# Patient Record
Sex: MALE | Employment: FULL TIME | URBAN - METROPOLITAN AREA
[De-identification: names, ages, dates, MRNs, and addresses within clinical notes are randomized per-mention and may not be internally consistent; named-entity substitution may affect disease eponyms.]

---

## 2017-01-25 ENCOUNTER — OFFICE VISIT - HEALTHEAST (OUTPATIENT)
Dept: FAMILY MEDICINE | Facility: CLINIC | Age: 42
End: 2017-01-25

## 2017-01-25 DIAGNOSIS — I10 ESSENTIAL HYPERTENSION, BENIGN: ICD-10-CM

## 2017-01-25 DIAGNOSIS — R05.9 COUGH: ICD-10-CM

## 2017-01-25 DIAGNOSIS — E78.49 OTHER HYPERLIPIDEMIA: ICD-10-CM

## 2017-01-25 ASSESSMENT — MIFFLIN-ST. JEOR: SCORE: 1616.83

## 2017-09-11 ENCOUNTER — OFFICE VISIT - HEALTHEAST (OUTPATIENT)
Dept: FAMILY MEDICINE | Facility: CLINIC | Age: 42
End: 2017-09-11

## 2017-09-11 DIAGNOSIS — J01.90 ACUTE SINUSITIS: ICD-10-CM

## 2017-11-29 ENCOUNTER — COMMUNICATION - HEALTHEAST (OUTPATIENT)
Dept: FAMILY MEDICINE | Facility: CLINIC | Age: 42
End: 2017-11-29

## 2017-12-19 ENCOUNTER — OFFICE VISIT - HEALTHEAST (OUTPATIENT)
Dept: FAMILY MEDICINE | Facility: CLINIC | Age: 42
End: 2017-12-19

## 2017-12-19 DIAGNOSIS — J30.9 ALLERGIC RHINITIS: ICD-10-CM

## 2017-12-27 ENCOUNTER — OFFICE VISIT - HEALTHEAST (OUTPATIENT)
Dept: FAMILY MEDICINE | Facility: CLINIC | Age: 42
End: 2017-12-27

## 2017-12-27 DIAGNOSIS — I10 ESSENTIAL HYPERTENSION, BENIGN: ICD-10-CM

## 2017-12-27 DIAGNOSIS — J30.9 ALLERGIC RHINITIS: ICD-10-CM

## 2017-12-27 DIAGNOSIS — J32.9 SINUSITIS: ICD-10-CM

## 2017-12-27 ASSESSMENT — MIFFLIN-ST. JEOR: SCORE: 1624.2

## 2018-02-27 ENCOUNTER — COMMUNICATION - HEALTHEAST (OUTPATIENT)
Dept: FAMILY MEDICINE | Facility: CLINIC | Age: 43
End: 2018-02-27

## 2018-03-28 ENCOUNTER — COMMUNICATION - HEALTHEAST (OUTPATIENT)
Dept: FAMILY MEDICINE | Facility: CLINIC | Age: 43
End: 2018-03-28

## 2018-03-28 ENCOUNTER — OFFICE VISIT - HEALTHEAST (OUTPATIENT)
Dept: FAMILY MEDICINE | Facility: CLINIC | Age: 43
End: 2018-03-28

## 2018-03-28 DIAGNOSIS — T48.5X5A RHINITIS MEDICAMENTOSA: ICD-10-CM

## 2018-03-28 DIAGNOSIS — J30.9 ALLERGIC RHINITIS: ICD-10-CM

## 2018-03-28 DIAGNOSIS — J31.0 RHINITIS MEDICAMENTOSA: ICD-10-CM

## 2018-03-28 DIAGNOSIS — R09.81 CHRONIC NASAL CONGESTION: ICD-10-CM

## 2018-03-28 DIAGNOSIS — I10 ESSENTIAL HYPERTENSION, BENIGN: ICD-10-CM

## 2018-03-28 ASSESSMENT — MIFFLIN-ST. JEOR: SCORE: 1632.71

## 2018-04-17 ENCOUNTER — OFFICE VISIT - HEALTHEAST (OUTPATIENT)
Dept: OTOLARYNGOLOGY | Facility: CLINIC | Age: 43
End: 2018-04-17

## 2018-04-17 DIAGNOSIS — T48.5X5A RHINITIS MEDICAMENTOSA: ICD-10-CM

## 2018-04-17 DIAGNOSIS — J31.0 RHINITIS MEDICAMENTOSA: ICD-10-CM

## 2018-04-17 DIAGNOSIS — J30.9 ALLERGIC RHINITIS: ICD-10-CM

## 2018-05-02 ENCOUNTER — OFFICE VISIT - HEALTHEAST (OUTPATIENT)
Dept: FAMILY MEDICINE | Facility: CLINIC | Age: 43
End: 2018-05-02

## 2018-05-02 DIAGNOSIS — L30.9 ECZEMA: ICD-10-CM

## 2018-05-02 DIAGNOSIS — J30.9 ALLERGIC RHINITIS: ICD-10-CM

## 2018-05-02 DIAGNOSIS — R09.81 CHRONIC NASAL CONGESTION: ICD-10-CM

## 2018-05-02 ASSESSMENT — MIFFLIN-ST. JEOR: SCORE: 1624.49

## 2019-01-26 ENCOUNTER — COMMUNICATION - HEALTHEAST (OUTPATIENT)
Dept: FAMILY MEDICINE | Facility: CLINIC | Age: 44
End: 2019-01-26

## 2019-04-24 ENCOUNTER — COMMUNICATION - HEALTHEAST (OUTPATIENT)
Dept: FAMILY MEDICINE | Facility: CLINIC | Age: 44
End: 2019-04-24

## 2019-04-24 DIAGNOSIS — E78.00 PURE HYPERCHOLESTEROLEMIA: ICD-10-CM

## 2019-08-19 ENCOUNTER — COMMUNICATION - HEALTHEAST (OUTPATIENT)
Dept: FAMILY MEDICINE | Facility: CLINIC | Age: 44
End: 2019-08-19

## 2019-08-19 DIAGNOSIS — I10 ESSENTIAL HYPERTENSION, BENIGN: ICD-10-CM

## 2019-10-01 ENCOUNTER — HEALTH MAINTENANCE LETTER (OUTPATIENT)
Age: 44
End: 2019-10-01

## 2019-10-07 ENCOUNTER — OFFICE VISIT - HEALTHEAST (OUTPATIENT)
Dept: FAMILY MEDICINE | Facility: CLINIC | Age: 44
End: 2019-10-07

## 2019-10-07 DIAGNOSIS — L72.3 SEBACEOUS CYST: ICD-10-CM

## 2019-10-07 ASSESSMENT — MIFFLIN-ST. JEOR: SCORE: 1624.25

## 2019-12-23 ENCOUNTER — COMMUNICATION - HEALTHEAST (OUTPATIENT)
Dept: FAMILY MEDICINE | Facility: CLINIC | Age: 44
End: 2019-12-23

## 2019-12-23 DIAGNOSIS — I10 ESSENTIAL HYPERTENSION, BENIGN: ICD-10-CM

## 2020-02-21 ENCOUNTER — COMMUNICATION - HEALTHEAST (OUTPATIENT)
Dept: FAMILY MEDICINE | Facility: CLINIC | Age: 45
End: 2020-02-21

## 2020-02-21 DIAGNOSIS — E78.00 PURE HYPERCHOLESTEROLEMIA: ICD-10-CM

## 2020-03-13 ENCOUNTER — COMMUNICATION - HEALTHEAST (OUTPATIENT)
Dept: FAMILY MEDICINE | Facility: CLINIC | Age: 45
End: 2020-03-13

## 2020-03-13 DIAGNOSIS — I10 ESSENTIAL HYPERTENSION, BENIGN: ICD-10-CM

## 2020-11-20 ENCOUNTER — COMMUNICATION - HEALTHEAST (OUTPATIENT)
Dept: FAMILY MEDICINE | Facility: CLINIC | Age: 45
End: 2020-11-20

## 2020-11-20 DIAGNOSIS — I10 ESSENTIAL HYPERTENSION, BENIGN: ICD-10-CM

## 2021-01-15 ENCOUNTER — HEALTH MAINTENANCE LETTER (OUTPATIENT)
Age: 46
End: 2021-01-15

## 2021-03-08 ENCOUNTER — VIRTUAL VISIT (OUTPATIENT)
Dept: FAMILY MEDICINE | Facility: CLINIC | Age: 46
End: 2021-03-08
Payer: COMMERCIAL

## 2021-03-08 DIAGNOSIS — E78.2 MIXED HYPERLIPIDEMIA: ICD-10-CM

## 2021-03-08 DIAGNOSIS — I10 ESSENTIAL HYPERTENSION: Primary | ICD-10-CM

## 2021-03-08 PROCEDURE — 99204 OFFICE O/P NEW MOD 45 MIN: CPT | Mod: GT | Performed by: PHYSICIAN ASSISTANT

## 2021-03-08 RX ORDER — LISINOPRIL AND HYDROCHLOROTHIAZIDE 20; 25 MG/1; MG/1
1 TABLET ORAL DAILY
Qty: 30 TABLET | Refills: 0 | Status: SHIPPED | OUTPATIENT
Start: 2021-03-08 | End: 2021-03-22

## 2021-03-08 RX ORDER — LISINOPRIL AND HYDROCHLOROTHIAZIDE 20; 25 MG/1; MG/1
TABLET ORAL
COMMUNITY
Start: 2020-11-20 | End: 2021-03-08

## 2021-03-08 RX ORDER — ATORVASTATIN CALCIUM 20 MG/1
TABLET, FILM COATED ORAL
COMMUNITY
Start: 2020-02-24 | End: 2021-03-08

## 2021-03-08 RX ORDER — ATORVASTATIN CALCIUM 20 MG/1
20 TABLET, FILM COATED ORAL DAILY
Qty: 30 TABLET | Refills: 0 | Status: SHIPPED | OUTPATIENT
Start: 2021-03-08 | End: 2021-03-22

## 2021-03-08 SDOH — HEALTH STABILITY: MENTAL HEALTH: HOW OFTEN DO YOU HAVE A DRINK CONTAINING ALCOHOL?: NOT ASKED

## 2021-03-08 SDOH — HEALTH STABILITY: MENTAL HEALTH: HOW OFTEN DO YOU HAVE 6 OR MORE DRINKS ON ONE OCCASION?: NOT ASKED

## 2021-03-08 SDOH — HEALTH STABILITY: MENTAL HEALTH: HOW MANY STANDARD DRINKS CONTAINING ALCOHOL DO YOU HAVE ON A TYPICAL DAY?: NOT ASKED

## 2021-03-08 ASSESSMENT — PAIN SCALES - GENERAL: PAINLEVEL: NO PAIN (0)

## 2021-03-08 NOTE — PROGRESS NOTES
Fany is a 45 year old who is being evaluated via a billable video visit.      How would you like to obtain your AVS? MyChart  If the video visit is dropped, the invitation should be resent by: Text to cell phone: [phone]  Will anyone else be joining your video visit? No    Video Start Time: 7:40 AM    Assessment & Plan   Problem List Items Addressed This Visit     None      Visit Diagnoses     Essential hypertension    -  Primary    Relevant Medications    lisinopril-hydrochlorothiazide (ZESTORETIC) 20-25 MG tablet    Other Relevant Orders    Basic metabolic panel  (Ca, Cl, CO2, Creat, Gluc, K, Na, BUN)    Mixed hyperlipidemia        Relevant Medications    atorvastatin (LIPITOR) 20 MG tablet    Other Relevant Orders    Lipid Profile (Chol, Trig, HDL, LDL calc)       patient has not been seen in over 1-2 years, no recent labs, BP is high.  Refills are granted for 1 month only.  Patient needs to be seen in person to recheck BP, do labs     musculoskeletal problem needs to be addressed in person for proper exam and possible neck xray.      12 minutes spent on the date of the encounter doing chart review, history and exam, documentation and further activities as noted above        Tobacco Cessation:   reports that he has been smoking. He has been smoking about 0.50 packs per day. He has never used smokeless tobacco.          Return in about 1 month (around 4/8/2021).    Sintia Abdalla PA-C  [facility]    Kira Soares is a 45 year old who presents for the following health issues    HPI       Hyperlipidemia Follow-Up      Are you regularly taking any medication or supplement to lower your cholesterol?   Yes- Atorvastatin    Are you having muscle aches or other side effects that you think could be caused by your cholesterol lowering medication?  No    Hypertension Follow-up      Do you check your blood pressure regularly outside of the clinic? Yes     Are you following a low  salt diet? No    Are your blood pressures ever more than 140 on the top number (systolic) OR more   than 90 on the bottom number (diastolic), for example 140/90? Yes      How many servings of fruits and vegetables do you eat daily?  0-1    On average, how many sweetened beverages do you drink each day (Examples: soda, juice, sweet tea, etc.  Do NOT count diet or artificially sweetened beverages)?   1    How many days per week do you exercise enough to make your heart beat faster? 3 or less    How many minutes a day do you exercise enough to make your heart beat faster? 9 or less    How many days per week do you miss taking your medication? 0    Concern - Hand  Onset: 1 month  Description: Patient complains of numbness without pain on right hand index and pinky finger.  Intensity: severe  Progression of Symptoms:  same  Accompanying Signs & Symptoms: numbness  Previous history of similar problem: none  Precipitating factors:        Worsened by: bending elbow or typing  Alleviating factors:        Improved by: none  Therapies tried and outcome: ice        Review of Systems   Constitutional, HEENT, cardiovascular, pulmonary, gi and gu systems are negative, except as otherwise noted.      Objective    Vitals - Patient Reported  Systolic (Patient Reported): (!) 150  Diastolic (Patient Reported): (!) 90  Pain Score: No Pain (0)        Physical Exam   GENERAL: Healthy, alert and no distress  EYES: Eyes grossly normal to inspection.  No discharge or erythema, or obvious scleral/conjunctival abnormalities.  RESP: No audible wheeze, cough, or visible cyanosis.  No visible retractions or increased work of breathing.    SKIN: Visible skin clear. No significant rash, abnormal pigmentation or lesions.  NEURO: Cranial nerves grossly intact.  Mentation and speech appropriate for age.  PSYCH: Mentation appears normal, affect normal/bright, judgement and insight intact, normal speech and appearance well-groomed.                 Video-Visit Details    Type of service:  Video Visit    Video End Time: 8:02 AM    Originating Location (pt. Location): Home    Distant Location (provider location):  [facility]     Platform used for Video Visit: [employer]

## 2021-03-22 ENCOUNTER — OFFICE VISIT (OUTPATIENT)
Dept: FAMILY MEDICINE | Facility: CLINIC | Age: 46
End: 2021-03-22
Payer: COMMERCIAL

## 2021-03-22 VITALS
OXYGEN SATURATION: 97 % | DIASTOLIC BLOOD PRESSURE: 89 MMHG | WEIGHT: 176.4 LBS | TEMPERATURE: 97.7 F | SYSTOLIC BLOOD PRESSURE: 125 MMHG | HEIGHT: 67 IN | HEART RATE: 74 BPM | BODY MASS INDEX: 27.69 KG/M2

## 2021-03-22 DIAGNOSIS — I10 ESSENTIAL HYPERTENSION: ICD-10-CM

## 2021-03-22 DIAGNOSIS — G56.21 ULNAR NERVE COMPRESSION, RIGHT: Primary | ICD-10-CM

## 2021-03-22 DIAGNOSIS — E78.2 MIXED HYPERLIPIDEMIA: ICD-10-CM

## 2021-03-22 LAB
ANION GAP SERPL CALCULATED.3IONS-SCNC: 1 MMOL/L (ref 3–14)
BUN SERPL-MCNC: 17 MG/DL (ref 7–30)
CALCIUM SERPL-MCNC: 9.3 MG/DL (ref 8.5–10.1)
CHLORIDE SERPL-SCNC: 106 MMOL/L (ref 94–109)
CHOLEST SERPL-MCNC: 213 MG/DL
CO2 SERPL-SCNC: 33 MMOL/L (ref 20–32)
CREAT SERPL-MCNC: 1.24 MG/DL (ref 0.66–1.25)
GFR SERPL CREATININE-BSD FRML MDRD: 69 ML/MIN/{1.73_M2}
GLUCOSE SERPL-MCNC: 114 MG/DL (ref 70–99)
HDLC SERPL-MCNC: 38 MG/DL
LDLC SERPL CALC-MCNC: ABNORMAL MG/DL
NONHDLC SERPL-MCNC: 175 MG/DL
POTASSIUM SERPL-SCNC: 3.9 MMOL/L (ref 3.4–5.3)
SODIUM SERPL-SCNC: 140 MMOL/L (ref 133–144)
TRIGL SERPL-MCNC: 482 MG/DL

## 2021-03-22 PROCEDURE — 36415 COLL VENOUS BLD VENIPUNCTURE: CPT | Performed by: PHYSICIAN ASSISTANT

## 2021-03-22 PROCEDURE — 80061 LIPID PANEL: CPT | Performed by: PHYSICIAN ASSISTANT

## 2021-03-22 PROCEDURE — 80048 BASIC METABOLIC PNL TOTAL CA: CPT | Performed by: PHYSICIAN ASSISTANT

## 2021-03-22 PROCEDURE — 99214 OFFICE O/P EST MOD 30 MIN: CPT | Performed by: PHYSICIAN ASSISTANT

## 2021-03-22 RX ORDER — LISINOPRIL AND HYDROCHLOROTHIAZIDE 20; 25 MG/1; MG/1
1 TABLET ORAL DAILY
Qty: 90 TABLET | Refills: 3 | Status: SHIPPED | OUTPATIENT
Start: 2021-03-22 | End: 2022-04-18

## 2021-03-22 RX ORDER — ATORVASTATIN CALCIUM 20 MG/1
20 TABLET, FILM COATED ORAL DAILY
Qty: 90 TABLET | Refills: 3 | Status: SHIPPED | OUTPATIENT
Start: 2021-03-22 | End: 2022-04-18

## 2021-03-22 ASSESSMENT — PAIN SCALES - GENERAL: PAINLEVEL: NO PAIN (0)

## 2021-03-22 ASSESSMENT — MIFFLIN-ST. JEOR: SCORE: 1638.78

## 2021-03-22 NOTE — PROGRESS NOTES
"    Assessment & Plan   Problem List Items Addressed This Visit     None      Visit Diagnoses     Ulnar nerve compression, right    -  Primary    Relevant Orders    Orthopedic & Spine  Referral    Mixed hyperlipidemia        Relevant Medications    atorvastatin (LIPITOR) 20 MG tablet    Essential hypertension        Relevant Medications    lisinopril-hydrochlorothiazide (ZESTORETIC) 20-25 MG tablet         1. Continue to wear tennis elbow band   Follow up with ortho for further work up of the ulnar nerve compression     2. stble  Take medication daily  Labs are pending  3. BP is stable   Continue current medications  Follow up in 6-12 months     25 minutes spent on the date of the encounter doing chart review, history and exam, documentation and further activities as noted above       Tobacco Cessation:   reports that he has been smoking. He has been smoking about 0.50 packs per day. He has never used smokeless tobacco.  Tobacco Cessation Action Plan: Phone counseling: Place order for Tobacco Cessation Referral    BMI:   Estimated body mass index is 27.63 kg/m  as calculated from the following:    Height as of this encounter: 1.702 m (5' 7\").    Weight as of this encounter: 80 kg (176 lb 6.4 oz).   Weight management plan: Discussed healthy diet and exercise guidelines        Return in about 1 month (around 4/22/2021) for ortho.    Sintia Abdalla PA-C  [facility] MYKE Soares is a 45 year old who presents for the following health issues     HPI     Musculoskeletal problem/pain  Onset/Duration:1 month when tingling is constant. For 4-6 months the tingling has been happening  intermittently  Description  Location: 4th and 5th right digits feel tingly and numb  Joint Swelling: no  Redness: no  Pain: no  Warmth: no  Intensity:  moderate  Progression of Symptoms:  Constant, improving   Accompanying signs and symptoms:   Fevers: no  Numbness/tingling/weakness: YES - " "numbness, tingling  History  Trauma to the area: no  Recent illness:  no  Previous similar problem: YES used to have intermittent tingling in the finger for 4-6 months. Also used to have elbow tendonitis that resolves with icing for 6-12 month. No pain at this time.   Previous evaluation:  no  Precipitating or alleviating factors:  Aggravating factors include: keeping elbow flexed while typing makes tingling worse. Stretching elbow, relieves the symptoms.   Therapies tried and outcome: icing, tennis elbow band help    Hyperlipidemia Follow-Up      Are you regularly taking any medication or supplement to lower your cholesterol?   Yes- lipitor    Are you having muscle aches or other side effects that you think could be caused by your cholesterol lowering medication?  No    Hypertension Follow-up      Do you check your blood pressure regularly outside of the clinic? No     Are you following a low salt diet? No    Are your blood pressures ever more than 140 on the top number (systolic) OR more   than 90 on the bottom number (diastolic), for example 140/90? No      Review of Systems   Constitutional, HEENT, cardiovascular, pulmonary, gi and gu systems are negative, except as otherwise noted.      Objective    /89 (BP Location: Right arm, Patient Position: Sitting, Cuff Size: Adult Large)   Pulse 74   Temp 97.7  F (36.5  C) (Oral)   Ht 1.702 m (5' 7\")   Wt 80 kg (176 lb 6.4 oz)   SpO2 97%   BMI 27.63 kg/m    Body mass index is 27.63 kg/m .  Physical Exam   GENERAL: healthy, alert and no distress  NECK: no adenopathy, no asymmetry, masses, or scars and thyroid normal to palpation  RESP: lungs clear to auscultation - no rales, rhonchi or wheezes  CV: regular rate and rhythm, normal S1 S2, no S3 or S4, no murmur, click or rub, no peripheral edema and peripheral pulses strong  ABDOMEN: soft, nontender, no hepatosplenomegaly, no masses and bowel sounds normal  MS: RUE exam shows normal strength and muscle mass, no " deformities, no erythema, induration, or nodules, no evidence of joint effusion, ROM of all joints is normal, no evidence of joint instability and  strength is intact in the right hand. Wrist NROM, no tenderness, right elbow, NROM, no tenderness. Tinnels test is negative. Sensation is decreased in right 4th and 5th digits

## 2021-03-22 NOTE — PATIENT INSTRUCTIONS
At [facility], we strive to deliver an exceptional experience to you, every time we see you. If you receive a survey, please complete it as we do value your feedback.  If you have MyChart, you can expect to receive results automatically within 24 hours of their completion.  Your provider will send a note interpreting your results as well.   If you do not have MyChart, you should receive your results in about a week by mail.    Your care team:                            Family Medicine Internal Medicine   MD Santiago Sanchez MD Shantel Branch-Fleming, MD Srinivasa Vaka, MD Katya Belousova, PACELESTINE Quintero, APRN CNP    Luca Benjamin, MD Pediatrics   Hosea Romero, PACELESTINE Charles, CNP MD Delfina Leblanc APRN CNP   MD Jayne Toledo MD Deborah Mielke, MD Emelia Hitchcock, APRN [facility]      Clinic hours: Monday - Thursday 7 am-6 pm; Fridays 7 am-5 pm.   Urgent care: Monday - Friday 11 am-9 pm; Saturday and Sunday 9 am-5 pm.    Clinic: [phone]       [facility]: Monday - Thursday 8 am - 7 pm; Friday 8 am - 6 pm  [facility]: [phone]     Use [email or web address] for 24/7 diagnosis and treatment of dozens of conditions.

## 2021-03-26 ENCOUNTER — OFFICE VISIT (OUTPATIENT)
Dept: ORTHOPEDICS | Facility: CLINIC | Age: 46
End: 2021-03-26
Payer: COMMERCIAL

## 2021-03-26 VITALS
SYSTOLIC BLOOD PRESSURE: 134 MMHG | HEIGHT: 67 IN | DIASTOLIC BLOOD PRESSURE: 88 MMHG | WEIGHT: 176 LBS | BODY MASS INDEX: 27.62 KG/M2

## 2021-03-26 DIAGNOSIS — G56.21 ULNAR NERVE COMPRESSION, RIGHT: ICD-10-CM

## 2021-03-26 DIAGNOSIS — G56.21 ULNAR NEUROPATHY OF RIGHT UPPER EXTREMITY: Primary | ICD-10-CM

## 2021-03-26 PROCEDURE — 99203 OFFICE O/P NEW LOW 30 MIN: CPT | Performed by: FAMILY MEDICINE

## 2021-03-26 RX ORDER — METHYLPREDNISOLONE 4 MG
TABLET, DOSE PACK ORAL
Qty: 21 TABLET | Refills: 0 | Status: SHIPPED | OUTPATIENT
Start: 2021-03-26 | End: 2022-08-26

## 2021-03-26 ASSESSMENT — MIFFLIN-ST. JEOR: SCORE: 1636.96

## 2021-03-26 NOTE — PATIENT INSTRUCTIONS
Cubital Tunnel Syndrome    Cubital Tunnel Syndrome is a condition that involves pressure or stretching of the ulnar nerve (also known as the  funny bone  nerve), which can cause numbness or tingling in the ring and small fingers, pain in the forearm, and/or weakness in the hand. The ulnar nerve (Figure 1) runs in a groove on the inner side of the elbow.    Figure 1  Ulnar nerve at elbow joint (inner side of elbow), which is involved in cubital tunnel syndrome       Causes  There are a few causes of this ulnar nerve problem. These include:    Pressure: The nerve has little padding over it. Direct pressure (like leaning the arm on an arm rest) can press the nerve, causing the arm and hand -- especially the ring and small fingers -- to  fall asleep.   Stretching: Keeping the elbow bent for a long time can stretch the nerve behind the elbow. This can happen during sleep or when using a smart phone.  Anatomy: Sometimes, the ulnar nerve does not stay in its place and snaps back and forth over a bony bump as the elbow is moved. Repeated snapping can irritate the nerve. Sometimes, the soft tissues over the nerve become thicker or there is an  extra  muscle over the nerve that can keep it from working correctly.    Signs and Symptoms  Cubital tunnel syndrome can cause pain, loss of sensation, tingling and/or weakness.  Pins and needles  usually are felt in the ring and small fingers. These symptoms are often felt when the elbow is bent for a long period of time, such as while holding a phone or while sleeping. Some people feel weak or clumsy.    Diagnosis  Your doctor can learn much by asking you about your symptoms and examining you. S/he might test you for other medical problems like diabetes or thyroid disease. Sometimes, nerve testing (EMG/NCS) may be needed to see how much the nerve and muscle are being affected. This test also checks for other problems such as a pinched nerve in the neck, which can cause similar  symptoms.    Treatment  The first treatment is to avoid actions that cause symptoms. Wrapping a pillow or towel loosely around the elbow or wearing a splint at night to keep the elbow from bending can help. Avoiding leaning on the  funny bone  can also help. A hand therapist can help you find ways to avoid pressure on the nerve.      Cubital Tunnel Syndrome Brace        Sometimes, surgery may be needed to relieve the pressure on the nerve. This can involve releasing the nerve, moving the nerve to the front of the elbow, and/or removing a part of the bone. Your surgeon will talk to you about options.    Therapy is sometimes needed after surgery, and the time it takes to recover can vary. Numbness and tingling may improve quickly or slowly. It may take many months for recovery after surgery. Cubital tunnel symptoms may not totally go away after surgery, especially if symptoms are severe.        EXERCISES - Nerve Gliding          Ulnar nerve gliding exercises. While keeping your head in a neutral position: 1) Begin with your arm out, palm side of the hand facing up. 2) Bend the elbow toward you, palm side facing you. 3) Rotate the palm of your hand outward and bend your wrist so that the fingers are pointing towards you. 4) Twist your wrist so that the palm of your hand is now facing upward. 5) While your wrist remains bent, stretch out your arm into a straight position, with your fingers bent towards the floor. Hold each position for 5 seconds, repeat series 3-5 times.

## 2021-03-26 NOTE — PROGRESS NOTES
CHIEF COMPLAINT:  Consult (4th and 5th finger)       HISTORY OF PRESENT ILLNESS  Mr. Sage is a pleasant 45 year old year old male who presents to clinic today with numbness in his right 4th and 5th fingers.  Fany explains that it started gradually about a month and a half ago, causing weakness in his hand.  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  He does have some aching in the medial epicondylar region.  Denies any significant neck pain or increasing neck stiffness.  He has had some prolonged intermittent neck stiffness however.    Onset: gradual  Location: right 4th & 5th finger  Quality:  numb  Duration: 1.5 months   Severity: 5/10 at worst  Timing: Worsens throughout the day  Modifying factors: Rest, keeping the arm extending  Associated signs & symptoms: weakness  Treatments to date: None    Additional history: as documented    Review of Systems:    Have you recently had a a fever, chills, weight loss? No    Do you have any vision problems? No    Do you have any chest pain or edema? No    Do you have any shortness of breath or wheezing?  No    Do you have stomach problems? No    Do you have any numbness or focal weakness? Yes above    Do you have diabetes? No    Do you have problems with bleeding or clotting? No    Do you have an rashes or other skin lesions? No    MEDICAL HISTORY  There is no problem list on file for this patient.      Current Outpatient Medications   Medication Sig Dispense Refill     atorvastatin (LIPITOR) 20 MG tablet Take 1 tablet (20 mg) by mouth daily 90 tablet 3     lisinopril-hydrochlorothiazide (ZESTORETIC) 20-25 MG tablet Take 1 tablet by mouth daily 90 tablet 3       No Known Allergies    Family History   Problem Relation Age of Onset     Heart Surgery Father        Additional medical/Social/Surgical histories reviewed in [facility] and updated as appropriate.       PHYSICAL EXAM  There were no  vitals taken for this visit.    General  - normal appearance, in no obvious distress  Musculoskeletal -cervical region with full range of motion, nontender palpation, negative Spurling's test.  Musculoskeletal - Right elbow and upper extremity  - inspection: normal joint alignment, no obvious deformity, no swelling  - palpation: Mildly tender to palpation at cubital tunnel.  - ROM: Full elbow range of motion, painless  - strength: 5/5  strength, 5/5 flexion, extension, pronation, supination, 5/5 hand intrinsics  - special tests:  (-) Tinel's at elbow  Neuro  - no sensory or motor deficit in fingers with attention paid to digits 4 and 5, grossly normal coordination, normal muscle tone  Skin  - no ecchymosis, erythema, warmth, or induration, no obvious rash  Psych  - interactive, appropriate, normal mood and affect    IMAGING : None     ASSESSMENT & PLAN  Mr. Sage is a 45 year old year old male who presents to clinic today with ulnar neuropathy of right dominant upper extremity.      Diagnosis: Ulnar neuropathy of right upper extremity    Suspected cubital tunnel syndrome as historical worsening of paresthesias with prolonged sitting working at computer typing.  Cervical examination unremarkable otherwise and no significant strength deficits.  At this time would like to implement a cubital tunnel program.  He will start nerve gliding exercises, I have also provided a picture of the brace he can order on [employer] or at a local [employer] store for preventing flexion while sleeping.  Additionally he will start a Medrol pack which she can continue for the next 6 days.    Follow-up in 6 weeks time, sooner if worsening.  May consider EMG versus formal hand therapy referral at our next visit depending on symptom change.    It was a pleasure seeing Fany today.    Minor Bruce DO, [facility]  Primary Care Sports Medicine

## 2021-03-26 NOTE — LETTER
3/26/2021         RE: Fany Sage  [address]  [place] [address]        Dear Colleague,    Thank you for referring your patient, Fany Sage, to the [facility] [place]. Please see a copy of my visit note below.    CHIEF COMPLAINT:  Consult (4th and 5th finger)       HISTORY OF PRESENT ILLNESS  Mr. Sage is a pleasant 45 year old year old male who presents to clinic today with numbness in his right 4th and 5th fingers.  Fany explains that it started gradually about a month and a half ago, causing weakness in his hand.  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  He does have some aching in the medial epicondylar region.  Denies any significant neck pain or increasing neck stiffness.  He has had some prolonged intermittent neck stiffness however.    Onset: gradual  Location: right 4th & 5th finger  Quality:  numb  Duration: 1.5 months   Severity: 5/10 at worst  Timing: Worsens throughout the day  Modifying factors: Rest, keeping the arm extending  Associated signs & symptoms: weakness  Treatments to date: None    Additional history: as documented    Review of Systems:    Have you recently had a a fever, chills, weight loss? No    Do you have any vision problems? No    Do you have any chest pain or edema? No    Do you have any shortness of breath or wheezing?  No    Do you have stomach problems? No    Do you have any numbness or focal weakness? Yes above    Do you have diabetes? No    Do you have problems with bleeding or clotting? No    Do you have an rashes or other skin lesions? No    MEDICAL HISTORY  There is no problem list on file for this patient.      Current Outpatient Medications   Medication Sig Dispense Refill     atorvastatin (LIPITOR) 20 MG tablet Take 1 tablet (20 mg) by mouth daily 90 tablet 3     lisinopril-hydrochlorothiazide (ZESTORETIC) 20-25 MG tablet Take 1  tablet by mouth daily 90 tablet 3       No Known Allergies    Family History   Problem Relation Age of Onset     Heart Surgery Father        Additional medical/Social/Surgical histories reviewed in [facility] and updated as appropriate.       PHYSICAL EXAM  There were no vitals taken for this visit.    General  - normal appearance, in no obvious distress  Musculoskeletal -cervical region with full range of motion, nontender palpation, negative Spurling's test.  Musculoskeletal - Right elbow and upper extremity  - inspection: normal joint alignment, no obvious deformity, no swelling  - palpation: Mildly tender to palpation at cubital tunnel.  - ROM: Full elbow range of motion, painless  - strength: 5/5  strength, 5/5 flexion, extension, pronation, supination, 5/5 hand intrinsics  - special tests:  (-) Tinel's at elbow  Neuro  - no sensory or motor deficit in fingers with attention paid to digits 4 and 5, grossly normal coordination, normal muscle tone  Skin  - no ecchymosis, erythema, warmth, or induration, no obvious rash  Psych  - interactive, appropriate, normal mood and affect    IMAGING : None     ASSESSMENT & PLAN  Mr. Sage is a 45 year old year old male who presents to clinic today with ulnar neuropathy of right dominant upper extremity.      Diagnosis: Ulnar neuropathy of right upper extremity    Suspected cubital tunnel syndrome as historical worsening of paresthesias with prolonged sitting working at computer typing.  Cervical examination unremarkable otherwise and no significant strength deficits.  At this time would like to implement a cubital tunnel program.  He will start nerve gliding exercises, I have also provided a picture of the brace he can order on [employer] or at a local [employer] store for preventing flexion while sleeping.  Additionally he will start a Medrol pack which she can continue for the next 6 days.    Follow-up in 6 weeks time, sooner if worsening.  May consider EMG versus formal hand therapy  referral at our next visit depending on symptom change.    It was a pleasure seeing Fany today.    Minor Bruce DO, [facility]  Primary Care Sports Medicine        Again, thank you for allowing me to participate in the care of your patient.        Sincerely,        Minor Bruce DO

## 2021-05-07 ENCOUNTER — OFFICE VISIT (OUTPATIENT)
Dept: ORTHOPEDICS | Facility: CLINIC | Age: 46
End: 2021-05-07
Payer: COMMERCIAL

## 2021-05-07 ENCOUNTER — TELEPHONE (OUTPATIENT)
Dept: ORTHOPEDICS | Facility: CLINIC | Age: 46
End: 2021-05-07

## 2021-05-07 VITALS — DIASTOLIC BLOOD PRESSURE: 80 MMHG | HEART RATE: 78 BPM | SYSTOLIC BLOOD PRESSURE: 116 MMHG

## 2021-05-07 DIAGNOSIS — G56.21 ULNAR NEUROPATHY OF RIGHT UPPER EXTREMITY: Primary | ICD-10-CM

## 2021-05-07 PROCEDURE — 99213 OFFICE O/P EST LOW 20 MIN: CPT | Performed by: FAMILY MEDICINE

## 2021-05-07 ASSESSMENT — PAIN SCALES - GENERAL: PAINLEVEL: NO PAIN (0)

## 2021-05-07 NOTE — LETTER
5/7/2021         RE: Fany Sage  [address]  [place] [address]        Dear Colleague,    Thank you for referring your patient, Fany Sage, to the [facility] [place]. Please see a copy of my visit note below.    ESTABLISHED PATIENT FOLLOW-UP:  RECHECK (follow up for left arm neuropathy, left hand numbness. Reporting improvement since last visit)       HISTORY OF PRESENT ILLNESS  Mr. Sage is a pleasant 45 year old year old male who presents to clinic today for follow-up of ulnar neuropathy of right hand    Date of injury:3 mos  Date last seen: 3/26/21  Following Therapeutic Plan: Yes  Pain: Improved  Function: Improved  Interval History: Completed Medrol pack.  Fany purchased brace and wears ant night to block flexion.  Performing nerve gliding exercises.  He does note episodes of pain once or twice a day.  He feels this may be when he is typing.  He typically will fully extend his arm to rest it and this does ameliorate his symptoms.    He would be interested in considering additional treatment given his discomfort at this time.    Additional medical/Social/Surgical histories reviewed in [facility] and updated as appropriate.    REVIEW OF SYSTEMS (5/7/2021)  CONSTITUTIONAL: Denies fever and weight loss  GASTROINTESTINAL: Denies abdominal pain, nausea, vomiting  MUSCULOSKELETAL: See HPI  SKIN: Denies any recent rash or lesion  NEUROLOGICAL: Denies numbness or focal weakness     PHYSICAL EXAM  /80   Pulse 78     General  - normal appearance, in no obvious distress  Musculoskeletal -cervical region with full range of motion, nontender palpation, negative Spurling's test.  Musculoskeletal - Right elbow and upper extremity  - inspection: normal joint alignment, no obvious deformity, no swelling  - palpation: Mildly tender to palpation at cubital tunnel.  - ROM: Full elbow range of motion, painless  - strength: 5/5  strength, 5/5 flexion, extension, pronation,  supination, 5/5 hand intrinsics  - special tests:  (-) Tinel's at elbow  Neuro  - no sensory or motor deficit in fingers with attention paid to digits 4 and 5, grossly normal coordination, normal muscle tone  Skin  - no ecchymosis, erythema, warmth, or induration, no obvious rash  Psych  - interactive, appropriate, normal mood and affect     ASSESSMENT & PLAN  Mr. Sage is a 45 year old year old male who presents to clinic today with RECHECK (follow up for left arm neuropathy, left hand numbness. Reporting improvement since last visit)    Diagnosis: Ulnar neuropathy of right upper extremity     Differential diagnosis includes cubital tunnel syndrome, ulnar tunnel syndrome versus less likely cervical radiculitis.    Further diagnostics versus treatment reviewed with patient.  At this time we decided to move forward with formal hand therapy.  They may discuss ulnar nerve gliding in more detail, digital therapeutic measures.  He has no improvement over the next 4 weeks, will will perform EMG.  Patient questions were answered and he is understanding of the plan.    It was a pleasure seeing Fany.    Minor Bruce DO, [facility]  [facility] Medicine                [facility] FOLLOW-UP VISIT 5/7/2021    Fany Sage's chief complaint for this visit includes:  Chief Complaint   Patient presents with     RECHECK     follow up for left arm neuropathy, left hand numbness. Reporting improvement since last visit     PCP: No Ref-Primary, Physician    Referring Provider:  Sintia Abdalla PA-C  [address] [place] [address]    /80   Pulse 78   No Pain (0)       Interval History:     Follow up reason: Follow up for left hand numbness     Date of injury: no injury     Date last seen: 3/26/21    Following Therapeutic Plan: Yes     Pain: Improving    Function: Improving    Interval History:      Medical History:    Any recent changes to your medical history? No    Any new medication  prescribed since last visit? No    Review of Systems:    Do you have fever, chills, weight loss? No    Do you have any vision problems? No    Do you have any chest pain or edema? No    Do you have any shortness of breath or wheezing?  No    Do you have stomach problems? No    Do you have any urinary track issues? No    Do you have any numbness or focal weakness? No    Do you have diabetes? No    Do you have problems with bleeding or clotting? No    Do you have an rashes or other skin lesions? No        Again, thank you for allowing me to participate in the care of your patient.        Sincerely,        Minor Bruce, DO

## 2021-05-07 NOTE — TELEPHONE ENCOUNTER
5/7 Provided phone number [phone] to schedule follow up  in about 1 month (around 6/7/2021).    Lindsay haley Procedure   Orthopedics, Podiatry, Sports Medicine, ENT/Eye Specialties  [facility]   [phone]

## 2021-05-07 NOTE — PROGRESS NOTES
ESTABLISHED PATIENT FOLLOW-UP:  RECHECK (follow up for left arm neuropathy, left hand numbness. Reporting improvement since last visit)       HISTORY OF PRESENT ILLNESS  Mr. Sage is a pleasant 45 year old year old male who presents to clinic today for follow-up of ulnar neuropathy of right hand    Date of injury:3 mos  Date last seen: 3/26/21  Following Therapeutic Plan: Yes  Pain: Improved  Function: Improved  Interval History: Completed Medrol pack.  Fany purchased brace and wears ant night to block flexion.  Performing nerve gliding exercises.  He does note episodes of pain once or twice a day.  He feels this may be when he is typing.  He typically will fully extend his arm to rest it and this does ameliorate his symptoms.    He would be interested in considering additional treatment given his discomfort at this time.    Additional medical/Social/Surgical histories reviewed in [facility] and updated as appropriate.    REVIEW OF SYSTEMS (5/7/2021)  CONSTITUTIONAL: Denies fever and weight loss  GASTROINTESTINAL: Denies abdominal pain, nausea, vomiting  MUSCULOSKELETAL: See HPI  SKIN: Denies any recent rash or lesion  NEUROLOGICAL: Denies numbness or focal weakness     PHYSICAL EXAM  /80   Pulse 78     General  - normal appearance, in no obvious distress  Musculoskeletal -cervical region with full range of motion, nontender palpation, negative Spurling's test.  Musculoskeletal - Right elbow and upper extremity  - inspection: normal joint alignment, no obvious deformity, no swelling  - palpation: Mildly tender to palpation at cubital tunnel.  - ROM: Full elbow range of motion, painless  - strength: 5/5  strength, 5/5 flexion, extension, pronation, supination, 5/5 hand intrinsics  - special tests:  (-) Tinel's at elbow  Neuro  - no sensory or motor deficit in fingers with attention paid to digits 4 and 5, grossly normal coordination, normal muscle tone  Skin  - no ecchymosis, erythema, warmth, or induration, no  obvious rash  Psych  - interactive, appropriate, normal mood and affect     ASSESSMENT & PLAN  Mr. Sage is a 45 year old year old male who presents to clinic today with RECHECK (follow up for left arm neuropathy, left hand numbness. Reporting improvement since last visit)    Diagnosis: Ulnar neuropathy of right upper extremity     Differential diagnosis includes cubital tunnel syndrome, ulnar tunnel syndrome versus less likely cervical radiculitis.    Further diagnostics versus treatment reviewed with patient.  At this time we decided to move forward with formal hand therapy.  They may discuss ulnar nerve gliding in more detail, digital therapeutic measures.  He has no improvement over the next 4 weeks, will will perform EMG.  Patient questions were answered and he is understanding of the plan.    It was a pleasure seeing Fany.    Minor Bruce DO, CAM  Primary Care Sports Medicine

## 2021-05-07 NOTE — PROGRESS NOTES
[facility] FOLLOW-UP VISIT 5/7/2021    Fany Sage's chief complaint for this visit includes:  Chief Complaint   Patient presents with     RECHECK     follow up for left arm neuropathy, left hand numbness. Reporting improvement since last visit     PCP: No Ref-Primary, Physician    Referring Provider:  Sintia Abdalla PA-C  [address]  [place] [address]    /80   Pulse 78   No Pain (0)       Interval History:     Follow up reason: Follow up for left hand numbness     Date of injury: no injury     Date last seen: 3/26/21    Following Therapeutic Plan: Yes     Pain: Improving    Function: Improving    Interval History:      Medical History:    Any recent changes to your medical history? No    Any new medication prescribed since last visit? No    Review of Systems:    Do you have fever, chills, weight loss? No    Do you have any vision problems? No    Do you have any chest pain or edema? No    Do you have any shortness of breath or wheezing?  No    Do you have stomach problems? No    Do you have any urinary track issues? No    Do you have any numbness or focal weakness? No    Do you have diabetes? No    Do you have problems with bleeding or clotting? No    Do you have an rashes or other skin lesions? No

## 2021-05-07 NOTE — PATIENT INSTRUCTIONS
Thanks for coming today.  Ortho/Sports Medicine Clinic  [address] [place] [address]    To schedule future appointments in Ortho Clinic, you may call [phone].    To schedule ordered imaging by your provider:   Call Central Imaging [phone]    To schedule an injection ordered by your provider:  Call Central Imaging Injection scheduling line: [phone]  [facility] available online at:  [email or web address]    Please call if any further questions or concerns ([phone]).  Clinic hours 8 am to 5 pm.    Return to clinic (call) if symptoms worsen or fail to improve.

## 2021-05-14 NOTE — TELEPHONE ENCOUNTER
5/14 2nd attempt.  Provided phone number [phone] to schedule follow up  in about 1 month (around 6/7/2021).    Lindsay haley Procedure   Orthopedics, Podiatry, Sports Medicine, ENT/Eye Specialties  [facility]   [phone]

## 2021-05-17 ENCOUNTER — THERAPY VISIT (OUTPATIENT)
Dept: OCCUPATIONAL THERAPY | Facility: CLINIC | Age: 46
End: 2021-05-17
Payer: COMMERCIAL

## 2021-05-17 DIAGNOSIS — G56.21 CUBITAL TUNNEL SYNDROME ON RIGHT: ICD-10-CM

## 2021-05-17 PROCEDURE — 97035 APP MDLTY 1+ULTRASOUND EA 15: CPT | Mod: GO | Performed by: OCCUPATIONAL THERAPIST

## 2021-05-17 PROCEDURE — 97112 NEUROMUSCULAR REEDUCATION: CPT | Mod: GO | Performed by: OCCUPATIONAL THERAPIST

## 2021-05-17 PROCEDURE — 97165 OT EVAL LOW COMPLEX 30 MIN: CPT | Mod: GO | Performed by: OCCUPATIONAL THERAPIST

## 2021-05-17 NOTE — PROGRESS NOTES
Hand Therapy Initial Evaluation    Current Date:  5/17/2021  Referring Physician: Dr. Bruce    Subjective:  Fany Sage is a 45 year old right hand dominant male.    Diagnosis:Right cubital tunnel  DOI:  11/2020    Patient reports symptoms of pain, stiffness/loss of motion, weakness/loss of strength, numbness and tingling  of the right hand  which occurred due to an unknown etiology. Since onset symptoms are gradually getting better.  Special tests:  none.  Previous treatment: none.    General health as reported by patient is good.  Pertinent medical history includes:High Blood Pressure, Overweight, Smoking  Medical allergies:none.  Surgical history: none.  Medication history: High Blood Pressure, cholesterol.    Occupational Profile Information:  Current occupation is   Currently working in normal job without restrictions  Job Tasks: Computer Work, Repetitive Tasks  Barriers include:none  Prior functional level:  no limitations  Mobility: No difficulty  Transportation: drives  Leisure activities/hobbies: fishing,     Objective:  Pain Level (Scale 0-10):   5/17/2021   At Rest 1/10   With Use 1/10     Pain Description:  Date 5/17/2021   Location ring finger and small finger   Pain Quality Numb and Tiring   Frequency intermittent     Pain is worst  daytime   Exacerbated by  typing, bending the elbow   Relieved by rest   Progression Gradually improving       ROM:  Elbow, wrist and hand are WNL  Edema:  None  Sensation:  Decreased Ulnar Nerve distribution. Patient reports numbness and tingling in the RF & SF    Numbness/Tingling Level Report  VAS(0-10) 5/17/2021   At Rest: 1/10   With Use: 3-4/10     Neural Tension Testing  UNT: Ulnar Neurodynamic Test (based on DS Gagan's ULNT)   5/17/2021   0-5 Scale 4-/5   Position:   0/5: Arm across abdomen in coronal plane  1/5: ER to neutral ABD shoulder to 45 degrees  2/5: ER shoulder to 90 degrees  3/5: Block shoulder and ABD shoulder to 110 degrees  4/5:  Fully pronate forearm, extend wrist and ring and small fingers  5/5: Flex elbow and bring hand to side of face  Notes:    (+) indicates beyond grade level but less than detention to next level    (-) indicates over detention to level    S1  onset/change of patient's symptoms    S2 definite stop point based on patient's discomfort level    Special Tests  Pain Report: - none  + mild    ++ moderate    +++ severe   Right 3/5/2020   Tinels at cubital tunnel -   Tinel's at [place] -   Elbow Flexion Test -   Ulnar nerve subluxation at elbow -   Froment's Test -     MMT Ulnar Nerve  Scale 0-5/5  Right 3/5/2020   FCU 5/5   FDP Ring and Small 5/5   ADM 4/5   ODM 5/5   FDM 5/5   Palmar Interossei 4/5   Dorsal Interossei 4/5   Lumbricals  5/5   Adductor Pollicis 5/5   FPB 5/5     Strength   (Measured in pounds)  Pain Report: - none  + mild    ++ moderate    +++ severe    5/17/2021 5/17/2021   Trials Left Right   1  2  3 88  80  75 85  79  77   Average 81 80     Lat Pinch 5/17/2021 5/17/2021   Trials Left Right   1  2  3 26 18   Average       3 Pt Pinch 5/17/2021 5/17/2021   Trials Left Right   1  2  3 19 14   Average       Palpation:  Pain Report:  Mild at cubital tunnel          Assessment:  Patient presents with symptoms consistent with diagnosis of cubital tunnel,  with conservative intervention.     Patient's limitations or Problem List includes:  Pain, Weakness, Sensory disturbance and Adherence in connective tissue of the right elbow, wrist, ring finger and small finger which interferes with the patient's ability to perform Work Tasks and Household Chores as compared to previous level of function.    Rehab Potential:  Excellent - Return to full activity, no limitations    Patient will benefit from skilled Occupational Therapy to increase flexibility, overall strength and sensation and decrease pain to return to previous activity level and resume normal daily tasks and to reach their rehab potential.    Barriers to  Learning:  No barrier    Communication Issues:  Patient appears to be able to clearly communicate and understand verbal and written communication and follow directions correctly.    Chart Review: Brief history including review of medical and/or therapy records relating to the presenting problem and Simple history review with patient    Identified Performance Deficits: home establishment and management, work and leisure activities    Assessment of Occupational Performance:  1-3 Performance Deficits  Clinical Decision Making (Complexity): Low complexity    Treatment Explanation:  The following has been discussed with the patient:  RX ordered/plan of care  Anticipated outcomes  Possible risks and side effects    Frequency:  2 X a month, once daily  Duration:  for 3 months  Treatment Plan:  Modalities:  US  Therapeutic Exercise:  PROM, Isotonics, Isometrics and Stabilization  Neuromuscular re-education:  Nerve Gliding and Kinesiotaping  Manual Techniques:  Myofascial release  Discharge Plan:  Achieve all LTG.  Independent in home treatment program.  Reach maximal therapeutic benefit.      Home Exercise Program:  Ulnar nerve glides  Avoid leaning on elbow and prolonged elbow flexion  MFR to flexors  Elbow brace at night    Next Visit:  No further visits scheduled at this time. The chart will be left open for one month to allow patient to schedule further appointments if problems develop. If no additional therapy sessions are scheduled, then the patient will be discharged and this will serve as a discharge note.

## 2021-05-28 ENCOUNTER — RECORDS - HEALTHEAST (OUTPATIENT)
Dept: ADMINISTRATIVE | Facility: CLINIC | Age: 46
End: 2021-05-28

## 2021-05-28 NOTE — TELEPHONE ENCOUNTER
Due to be seen    Rx renewed per Medication Renewal Policy. Medication was last renewed on 2/28/18.    Izabela Pope, [facility] Triage/Med Refill 4/26/2019     Requested Prescriptions   Pending Prescriptions Disp Refills     atorvastatin (LIPITOR) 20 MG tablet [Pharmacy Med Name: Atorvastatin Calcium Oral Tablet 20 MG] 90 tablet 1     Sig: TAKE ONE TABLET BY MOUTH ONE TIME DAILY.       Statins Refill Protocol (Hmg CoA Reductase Inhibitors) Passed - 4/24/2019  7:01 AM        Passed - PCP or prescribing provider visit in past 12 months      Last office visit with prescriber/PCP: 5/2/2018 Minor Mcintosh DO OR same dept: 5/2/2018 Minor Mcintosh DO OR same specialty: 5/2/2018 Minor Mcintosh DO  Last physical: Visit date not found Last MTM visit: Visit date not found   Next visit within 3 mo: Visit date not found  Next physical within 3 mo: Visit date not found  Prescriber OR PCP: Minor Peters DO  Last diagnosis associated with med order: There are no diagnoses linked to this encounter.  If protocol passes may refill for 12 months if within 3 months of last provider visit (or a total of 15 months).

## 2021-05-30 VITALS — HEIGHT: 67 IN | WEIGHT: 171.56 LBS | BODY MASS INDEX: 26.93 KG/M2

## 2021-05-31 VITALS — HEIGHT: 67 IN | BODY MASS INDEX: 27.18 KG/M2 | WEIGHT: 173.19 LBS

## 2021-05-31 NOTE — TELEPHONE ENCOUNTER
RN cannot approve Refill Request    RN can NOT refill this medication Protocol failed and NO refill given. Last office visit: 5/2/2018 Minor Mcintosh DO Last Physical: Visit date not found Last MTM visit: Visit date not found Last visit same specialty: 5/2/2018 Minor Mcintosh DO.  Next visit within 3 mo: Visit date not found  Next physical within 3 mo: Visit date not found      Flora Martin, [facility] Triage/Med Refill 8/19/2019    Requested Prescriptions   Pending Prescriptions Disp Refills     lisinopril-hydrochlorothiazide (PRINZIDE,ZESTORETIC) 20-25 mg per tablet [Pharmacy Med Name: Lisinopril-hydroCHLOROthiazide Oral Tablet 20-25 MG] 90 tablet 0     Sig: TAKE ONE TABLET BY MOUTH ONE TIME DAILY       Diuretics/Combination Diuretics Refill Protocol  Failed - 8/19/2019  1:31 PM        Failed - Visit with PCP or prescribing provider visit in past 12 months     Last office visit with prescriber/PCP: 5/2/2018 Minor Mcintosh DO OR same dept: Visit date not found OR same specialty: 5/2/2018 Minor Mcintosh DO  Last physical: Visit date not found Last MTM visit: Visit date not found   Next visit within 3 mo: Visit date not found  Next physical within 3 mo: Visit date not found  Prescriber OR PCP: Minor Peters DO  Last diagnosis associated with med order: There are no diagnoses linked to this encounter.  If protocol passes may refill for 12 months if within 3 months of last provider visit (or a total of 15 months).             Failed - Serum Potassium in past 12 months      No results found for: LN-POTASSIUM          Failed - Serum Sodium in past 12 months      No results found for: LN-SODIUM          Failed - Blood pressure on file in past 12 months     BP Readings from Last 1 Encounters:   05/02/18 112/70             Failed - Serum Creatinine in past 12 months      Creatinine   Date Value Ref Range Status   10/20/2016 1.17 0.70 -  1.30 mg/dL Final

## 2021-05-31 NOTE — TELEPHONE ENCOUNTER
30 day supply signed. Needs appointment before additional refills.    Please assist in scheduling appt

## 2021-05-31 NOTE — TELEPHONE ENCOUNTER
Left message to call back for: Fnay  Information to relay to patient:  Needs a follow up or even a Physical in next 30 days please help schedule upon return call, let pr know Dr Funez did refill Rx for 30 day supply but we are unable to fill after that

## 2021-05-31 NOTE — TELEPHONE ENCOUNTER
Left message to call back for: Fany Sage  Information to relay to patient:  Please notify patient and help schedule appointment.  Thank you.

## 2021-06-01 VITALS — WEIGHT: 175.06 LBS | HEIGHT: 67 IN | BODY MASS INDEX: 27.48 KG/M2

## 2021-06-01 VITALS — HEIGHT: 67 IN | BODY MASS INDEX: 27.19 KG/M2 | WEIGHT: 173.25 LBS

## 2021-06-02 NOTE — PROGRESS NOTES
"Assessment / Impression     1. Sebaceous cyst           Plan:     His symptoms appear to be due to a sebaceous cyst.  This is no longer inflamed since he drained it few weeks ago.  There is still residual bump.  I did not recommend any further treatment at this time as there are no signs of infection.  He may apply warm compresses as needed.  If symptoms become worse again he will contact the clinic to try and schedule an appointment then.    Subjective:      HPI: Fany Sage is a 44 y.o. male who presents to the clinic complaining of a bump on his left buttock that he noticed about a month ago.  It was larger and more inflamed.  He reports that it came to ahead and he was able to express out purulent fluid.  He feels like the bump has become much smaller, but he can still feel it.  Occasionally it feels a little sore.  He is otherwise feeling healthy.  He denies having fevers.  He reports having a similar boil in his armpit in the past.        Review of Systems  All other systems reviewed and are negative.     Social History     Tobacco Use   Smoking Status Current Every Day Smoker     Packs/day: 0.50     Years: 20.00     Pack years: 10.00   Smokeless Tobacco Never Used       Family History   Problem Relation Age of Onset     Stroke Mother      Hypertension Mother      Heart disease Father      Hyperlipidemia Father      Hypertension Father        Objective:     /82 (Patient Site: Left Arm, Patient Position: Sitting, Cuff Size: Adult Regular)   Pulse 72   Temp 98  F (36.7  C) (Oral)   Resp 16   Ht 5' 6.7\" (1.694 m)   Wt 174 lb 4 oz (79 kg)   BMI 27.54 kg/m    Physical Examination: General appearance - alert, well appearing, and in no distress  Neurological: alert, oriented, normal speech, no focal findings or movement disorder noted.    Extremities: No edema, no clubbing or cyanosis  Skin: There is a small, nontender bump with a tiny overlying scab in the left buttock.  The surrounding skin appears " normal.  There is no purulent discharge present.    No results found for this or any previous visit (from the past 168 hour(s)).    Current Outpatient Medications   Medication Sig     atorvastatin (LIPITOR) 20 MG tablet TAKE ONE TABLET BY MOUTH ONE TIME DAILY.     lisinopril-hydrochlorothiazide (PRINZIDE,ZESTORETIC) 20-25 mg per tablet Take 1 tablet by mouth daily. Needs appointment before additional refills.

## 2021-06-03 VITALS
HEIGHT: 67 IN | TEMPERATURE: 98 F | HEART RATE: 72 BPM | SYSTOLIC BLOOD PRESSURE: 120 MMHG | BODY MASS INDEX: 27.35 KG/M2 | DIASTOLIC BLOOD PRESSURE: 82 MMHG | RESPIRATION RATE: 16 BRPM | WEIGHT: 174.25 LBS

## 2021-06-04 NOTE — TELEPHONE ENCOUNTER
RN cannot approve Refill Request    RN can NOT refill this medication PCP messaged that patient is overdue for Labs. Last office visit: Visit date not found Last Physical: Visit date not found Last MTM visit: Visit date not found Last visit same specialty: 10/7/2019 Minor Mcintosh DO.  Next visit within 3 mo: Visit date not found  Next physical within 3 mo: Visit date not found      Mumtaz Nick, [facility] Connection Triage/Med Refill 12/25/2019    Requested Prescriptions   Pending Prescriptions Disp Refills     lisinopril-hydrochlorothiazide (PRINZIDE,ZESTORETIC) 20-25 mg per tablet [Pharmacy Med Name: Lisinopril-hydroCHLOROthiazide Oral Tablet 20-25 MG] 30 tablet 0     Sig: Take 1 tablet by mouth daily       Diuretics/Combination Diuretics Refill Protocol  Failed - 12/23/2019  7:01 AM        Failed - Serum Potassium in past 12 months      No results found for: LN-POTASSIUM          Failed - Serum Sodium in past 12 months      No results found for: LN-SODIUM          Failed - Serum Creatinine in past 12 months      Creatinine   Date Value Ref Range Status   10/20/2016 1.17 0.70 - 1.30 mg/dL Final             Passed - Visit with PCP or prescribing provider visit in past 12 months     Last office visit with prescriber/PCP: Visit date not found OR same dept: 10/7/2019 Minor Mcintosh DO OR same specialty: 10/7/2019 Minor Mcintosh DO  Last physical: Visit date not found Last MTM visit: Visit date not found   Next visit within 3 mo: Visit date not found  Next physical within 3 mo: Visit date not found  Prescriber OR PCP: Lela Funez MD  Last diagnosis associated with med order: 1. Benign Essential Hypertension  - lisinopril-hydrochlorothiazide (PRINZIDE,ZESTORETIC) 20-25 mg per tablet [Pharmacy Med Name: Lisinopril-hydroCHLOROthiazide Oral Tablet 20-25 MG]; Take 1 tablet by mouth daily  Dispense: 30 tablet; Refill: 0    If protocol passes may refill for 12 months if within  3 months of last provider visit (or a total of 15 months).             Passed - Blood pressure on file in past 12 months     BP Readings from Last 1 Encounters:   10/07/19 120/82

## 2021-06-06 NOTE — TELEPHONE ENCOUNTER
RN cannot approve Refill Request    RN can NOT refill this medication Protocol failed and NO refill given.       Izabela Pope, [facility] Triage/Med Refill 3/15/2020    Requested Prescriptions   Pending Prescriptions Disp Refills     lisinopriL-hydrochlorothiazide (PRINZIDE,ZESTORETIC) 20-25 mg per tablet [Pharmacy Med Name: Lisinopril-hydroCHLOROthiazide Oral Tablet 20-25 MG] 30 tablet 0     Sig: Take 1 tablet by mouth daily       Diuretics/Combination Diuretics Refill Protocol  Failed - 3/13/2020  7:01 AM        Failed - Serum Potassium in past 12 months      No results found for: LN-POTASSIUM          Failed - Serum Sodium in past 12 months      No results found for: LN-SODIUM          Failed - Serum Creatinine in past 12 months      Creatinine   Date Value Ref Range Status   10/20/2016 1.17 0.70 - 1.30 mg/dL Final             Passed - Visit with PCP or prescribing provider visit in past 12 months     Last office visit with prescriber/PCP: 10/7/2019 Minor Mcintosh DO OR same dept: 10/7/2019 Minor Mcintosh DO OR same specialty: 10/7/2019 Minor Mcintosh DO  Last physical: Visit date not found Last MTM visit: Visit date not found   Next visit within 3 mo: Visit date not found  Next physical within 3 mo: Visit date not found  Prescriber OR PCP: Minor Peters DO  Last diagnosis associated with med order: 1. Benign Essential Hypertension  - lisinopriL-hydrochlorothiazide (PRINZIDE,ZESTORETIC) 20-25 mg per tablet [Pharmacy Med Name: Lisinopril-hydroCHLOROthiazide Oral Tablet 20-25 MG]; Take 1 tablet by mouth daily  Dispense: 30 tablet; Refill: 0    If protocol passes may refill for 12 months if within 3 months of last provider visit (or a total of 15 months).             Passed - Blood pressure on file in past 12 months     BP Readings from Last 1 Encounters:   10/07/19 120/82

## 2021-06-06 NOTE — TELEPHONE ENCOUNTER
Refill Approved    Rx renewed per Medication Renewal Policy. Medication was last renewed on 4/26/19.    Sharla Fernandez, [facility] Triage/Med Refill 2/24/2020     Requested Prescriptions   Pending Prescriptions Disp Refills     atorvastatin (LIPITOR) 20 MG tablet [Pharmacy Med Name: Atorvastatin Calcium Oral Tablet 20 MG] 90 tablet 0     Sig: TAKE ONE TABLET BY MOUTH ONE TIME DAILY       Statins Refill Protocol (Hmg CoA Reductase Inhibitors) Passed - 2/21/2020  7:01 AM        Passed - PCP or prescribing provider visit in past 12 months      Last office visit with prescriber/PCP: 10/7/2019 Minor Mcintosh DO OR same dept: 10/7/2019 Minor Mcintosh DO OR same specialty: 10/7/2019 Minor Mcintosh DO  Last physical: Visit date not found Last MTM visit: Visit date not found   Next visit within 3 mo: Visit date not found  Next physical within 3 mo: Visit date not found  Prescriber OR PCP: Minor Peters DO  Last diagnosis associated with med order: 1. Pure hypercholesterolemia  - atorvastatin (LIPITOR) 20 MG tablet [Pharmacy Med Name: Atorvastatin Calcium Oral Tablet 20 MG]; TAKE ONE TABLET BY MOUTH ONE TIME DAILY.  Dispense: 90 tablet; Refill: 0    If protocol passes may refill for 12 months if within 3 months of last provider visit (or a total of 15 months).

## 2021-06-08 NOTE — PROGRESS NOTES
"Assessment / Impression     1. Cough  Bordetella pertussis PCR   2. Other hyperlipidemia     3. Benign Essential Hypertension         Plan:     He was given a prescription for Z-Hill.  I encouraged aggressive hydration.  We are screening for pertussis.  He was given new prescription for atorvastatin and he will continue the lisinopril-hydrochlorothiazide which has been working well to control his blood pressure.    Subjective:      HPI: Fany Sage is a 41 y.o. male who presents to the clinic to be evaluated for cough and cold symptoms that he has had for 5 weeks.  He describes feeling some mild congestion in his chest and throat.  He denies having fevers.  His 5-year-old daughter has been ill with similar symptoms.  She still has a lingering cough as well.  He thinks he may have noticed some wheezing symptoms.  He has not acutely short of breath.    He has a history of hyperlipidemia and hypertension.  Last October his total cholesterol is 221, triglycerides 497, HDL 30, LDL 95.  He was started on atorvastatin, but never started the prescription.  He would like to start it now.  He is currently on lisinopril-hydrochlorothiazide for hypertension.      Review of Systems  Pertinent items are noted in HPI.       Objective:     Visit Vitals     /80 (Patient Site: Right Arm, Patient Position: Sitting, Cuff Size: Adult Regular)     Pulse 80     Temp 98.1  F (36.7  C) (Oral)     Resp 16     Ht 5' 7\" (1.702 m)     Wt 171 lb 9 oz (77.8 kg)     SpO2 96%  Comment: RA     BMI 26.87 kg/m2       General Appearance: Alert, cooperative, appears slightly fatigued.  Head: Normocephalic, without obvious abnormality, atraumatic.  Eyes: PERRL, conjunctiva/corneas clear, EOM's intact.  Ears: Normal TM's and external ear canals, both ears.  Throat: Slightly erythematous. No exudates.  Neck: Supple, symmetrical, trachea midline, no lymphadenopathy.  Lungs: Clear to auscultation bilaterally, respirations unlabored.  He coughs " frequently throughout the exam.  Heart:: Regular rate and rhythm.  Extremities: Extremities normal, atraumatic, no cyanosis or edema.        No results found for this or any previous visit (from the past 168 hour(s)).

## 2021-06-12 NOTE — PROGRESS NOTES
Assessment:   There were no encounter diagnoses.     Plan:   No medications were ordered this encounter    There are no Patient Instructions on file for this visit.  No Follow-up on file.    Subjective:   Fany Sage is a 42 y.o. male who submitted an eVisit request for evaluation of his No chief complaint on file..  See the questionnaire and message section of encounter report for information related to history of present illness and review of systems.    The following portions of the patient's history were reviewed and updated as appropriate:  He  does not have any pertinent problems on file.  He has No Known Allergies..     Objective:   No exam performed today, patient submitted as eVisit.

## 2021-06-13 NOTE — TELEPHONE ENCOUNTER
RN cannot approve Refill Request    RN can NOT refill this medication Protocol failed and NO refill given. Last office visit: 10/7/2019 Minor Mcintosh DO Last Physical: Visit date not found Last MTM visit: Visit date not found Last visit same specialty: 10/7/2019 Minor Mcintosh DO.  Next visit within 3 mo: Visit date not found  Next physical within 3 mo: Visit date not found      Izabela Pope, [facility] [place] Connection Triage/Med Refill 11/20/2020    Requested Prescriptions   Pending Prescriptions Disp Refills     lisinopriL-hydrochlorothiazide (PRINZIDE,ZESTORETIC) 20-25 mg per tablet [Pharmacy Med Name: Lisinopril-hydroCHLOROthiazide Oral Tablet 20-25 MG] 90 tablet 0     Sig: Take 1 tablet by mouth daily. due for appt       Diuretics/Combination Diuretics Refill Protocol  Failed - 11/20/2020  2:00 AM        Failed - Visit with PCP or prescribing provider visit in past 12 months     Last office visit with prescriber/PCP: 10/7/2019 Minor Mcintosh DO OR same dept: Visit date not found OR same specialty: 10/7/2019 Minor Mcintosh DO  Last physical: Visit date not found Last MTM visit: Visit date not found   Next visit within 3 mo: Visit date not found  Next physical within 3 mo: Visit date not found  Prescriber OR PCP: Minor Peters DO  Last diagnosis associated with med order: 1. Benign Essential Hypertension  - lisinopriL-hydrochlorothiazide (PRINZIDE,ZESTORETIC) 20-25 mg per tablet [Pharmacy Med Name: Lisinopril-hydroCHLOROthiazide Oral Tablet 20-25 MG]; Take 1 tablet by mouth daily. due for appt  Dispense: 90 tablet; Refill: 0    If protocol passes may refill for 12 months if within 3 months of last provider visit (or a total of 15 months).             Failed - Serum Potassium in past 12 months      No results found for: LN-POTASSIUM          Failed - Serum Sodium in past 12 months      No results found for: LN-SODIUM          Failed - Blood pressure  on file in past 12 months     BP Readings from Last 1 Encounters:   10/07/19 120/82             Failed - Serum Creatinine in past 12 months      Creatinine   Date Value Ref Range Status   10/20/2016 1.17 0.70 - 1.30 mg/dL Final

## 2021-06-13 NOTE — TELEPHONE ENCOUNTER
1st attempt to contact patient    Left message to call back for: Fany      Information to relay to patient:  LMTCB    Please advise patient he is overdue for a visit in the office. He will need an office visit before any further refills will be sent for him.    Provider did send in a 30 day supply to get him through until able to be seen.    Please assist patient in scheduling an office visit.    Karime Carrington, CMA

## 2021-06-14 NOTE — PROGRESS NOTES
Assessment:   The encounter diagnosis was Allergic rhinitis.     Plan:   No medications were ordered this encounter    Patient Instructions     Ms. Soares, your symptoms sound very uncomfortable. Unfortunately they are a bit more complex than we can evaluate over the computer. I suggest either a visit to your normal doctor (Minor Peters) or a visit to an urgent care.    Bobo Dickens PA-C    Based on the information provided, I would recommend you come in for an appointment to discuss these symptoms. Please schedule an appointment.    You will not be charged for this eVisit.    Return for further follow up if needed. Call [phone]) or schedule an appointment via [facility]..    Subjective:   Fany Sage is a 42 y.o. male who submitted an eVisit request for evaluation of his Sinus Problem.  See the questionnaire and message section of encounter report for information related to history of present illness and review of systems.    The following portions of the patient's history were reviewed and updated as appropriate:  He  does not have any pertinent problems on file.  He has No Known Allergies..     Objective:   No exam performed today, patient submitted as eVisit.

## 2021-06-15 NOTE — PROGRESS NOTES
"Assessment / Impression     1. Sinusitis     2. Allergic rhinitis     3. Benign Essential Hypertension           Plan:     He was given a prescription for Augmentin.  I encouraged rest and hydration.  He may take Tylenol or ibuprofen as needed for discomfort.  He may also consider restarting the antihistamine to see if this helps improve symptoms.  He is not interested in trying Flonase since this has not been helpful in the past.  His blood pressures currently well controlled with lisinopril-hydrochlorothiazide.    Subjective:      HPI: Fany Sage is a 42 y.o. male who presents to the clinic to be evaluated for ongoing sinus congestion and pressure that he has had over the past month.  He describes having quite a bit of drainage.  He denies chest congestion, shortness of breath or wheezing symptoms.  He denies having a fever.  He has a history of seasonal allergies, but he is currently off his antihistamines.  He also has a history of hypertension and takes lisinopril and hydrochlorothiazide which he tolerates well.      Review of Systems  Pertinent items are noted in HPI.       Objective:     /78 (Patient Site: Right Arm, Patient Position: Sitting, Cuff Size: Adult Regular)  Pulse 80  Temp 97.6  F (36.4  C) (Oral)   Resp 16  Ht 5' 7\" (1.702 m)  Wt 173 lb 3 oz (78.6 kg)  BMI 27.12 kg/m2    General Appearance: Alert, cooperative, appears slightly fatigued.  Head: Normocephalic, without obvious abnormality, atraumatic.  Frontal sinuses nontender, maxillary sinuses tender to palpation bilaterally  Eyes: PERRL, conjunctiva/corneas clear, EOM's intact.  Ears: Normal TM's and external ear canals, both ears.  Nose: Nares congested.  Throat: Slightly erythematous. No exudates.  Neck: Supple, symmetrical, trachea midline, no lymphadenopathy.  Lungs: Clear to auscultation bilaterally, respirations unlabored.  Heart:: Regular rate and rhythm.  Extremities: Extremities normal, atraumatic, no cyanosis or " edema.        No results found for this or any previous visit (from the past 168 hour(s)).

## 2021-06-17 NOTE — PROGRESS NOTES
Assessment / Impression     1. Chronic nasal congestion  Ambulatory referral to ENT   2. Allergic rhinitis  CANCELED: Ambulatory referral to Allergy   3. Rhinitis medicamentosa     4. Benign Essential Hypertension           Plan:     He was given a referral to ENT to have his chronic nasal congestion symptoms evaluated further.  I recommended he stop using Afrin right away.  He likely has some component of rhinitis medicamentosa which is contributing to his symptoms.  That being said, he reports having issues with chronic nasal congestion before he ever started that medication.  He was given a prescription for azelastine nasal spray to see if this helps. He has a history of seasonal allergies and has never had formal allergy testing done before.  He does not feel like his current symptoms are related to that.  I put in a referral for him to be evaluated at the allergy clinic, but he prefers to see ENT first before pursuing that.  I think this is reasonable.  His blood pressures currently well controlled on lisinopril and hydrochlorothiazide.    Subjective:      HPI: Fany Sage is a 42 y.o. male who presents to the clinic to be evaluated for chronic nasal congestion symptoms.  This is been a problem for him over the past year.  He has a history of seasonal allergies and takes over-the-counter allergy pills in the spring/summer/fall.  He has tried taking these medications during the winter months to, but they have not improved his nasal congestion symptoms.  He is also tried various nasal sprays such as Flonase which were not helpful.  Over the past couple of months he has been using over-the-counter Afrin regularly.  This is provided some short-term benefit, but his symptoms have not resolved.  He has a history of hypertension which has been well controlled on lisinopril-hydrochlorothiazide 20-25 mg daily.      Review of Systems  Pertinent items are noted in HPI.       Objective:     /80 (Patient Site: Left  "Arm, Patient Position: Sitting, Cuff Size: Adult Regular)  Pulse 80  Temp 98  F (36.7  C) (Oral)   Resp 20  Ht 5' 7\" (1.702 m)  Wt 175 lb 1 oz (79.4 kg)  BMI 27.42 kg/m2    General Appearance: Alert, cooperative, in no acute distress  Head: Normocephalic, without obvious abnormality, atraumatic.  Sinuses nontender to palpation  Eyes: PERRL, conjunctiva/corneas clear, EOM's intact.  Ears: Normal TM's and external ear canals, both ears.  Nose: Nares congested.  Throat: Clear  Neck: Supple, symmetrical, trachea midline, no lymphadenopathy.  Lungs: Clear to auscultation bilaterally, respirations unlabored.  Heart:: Regular rate and rhythm.        No results found for this or any previous visit (from the past 168 hour(s)).      "

## 2021-06-17 NOTE — PROGRESS NOTES
HPI: This patient is a 43yo M who presents for evaluation of the sinuses at the request of Dr. Richards. The patient reports congestion issues each year during the winter months. This year has been particularly bad. He states he has tried several different OTC sprays for days at a time, though it is difficult to know which sprays these were as he does not know the names. He has been treated with antibiotics twice in the past for nasal congestion and clear rhinorrhea; not surprisingly, these were not helpful. There have not really been episodes of high fever, localized sinus pain, tooth pain, and pururlent drainage. Has a prescription for astelin, but he isn't really using anything regularly enough to be helpful. Denies dental grinding/clenching. 1/2ppd smoker x 20yrs. After further questioning following lack of response to topical neosynephrine here in clinic, he has been using OTC spray decongestants pretty regularly over the winter and only stopped 2 weeks ago because they stopped being helpful.    Past medical history, surgical history, social history, family history, medications, and allergies have been reviewed with the patient and are documented above.    Review of Systems: a 10-system review was performed. Pertinent positives are noted in the HPI and on a separate scanned document in the chart.    PHYSICAL EXAMINATION:  GEN: no acute distress, normocephalic  EYES: extraocular movements are intact, pupils are equal and round. Sclera clear.   EARS: auricles are normally formed. The external auditory canals are clear with minimal to no cerumen. Tympanic membranes are intact bilaterally with no signs of infection, effusion, retractions, or perforations.  NOSE: anterior nares are patent. There are no masses or lesions. The septum is non-obstructing. Rather significant turbinate enlargement with some hyperemia concerning for rhinitis medicamentosa--did not shrink or respond to spray  NASAL ENDOSCOPY: very  hyponasal voice spurred a need to look for polyps. No polyps in the nasal cavities, no adenoid hypertrophy, NP clear. Prominent anterior nasal floor bilaterally.  OC/OP: clear, dentition is in good repair. The tongue and palate are fully mobile and symmetric. The floor of mouth, base of tongue, and tonsils are soft and symmetric.  NECK: soft and supple. No lymphadenopathy or masses. Airway is midline.  NEURO: CN VII symmetric and strong. alert and oriented. No spontaneous nystagmus. Gait is normal.  PULM: breathing comfortably on room air, normal chest expansion with respiration  HEART: no cyanosis or clubbing    MEDICAL DECISION-MAKING: This patient is a 43yo M with rhinitis medicamentosa. Explained that he will need to finish withdrawing from these medications and the nasal tissues will slowly return to normal. In the meantime, he can start using a nasal steroid spray as he probably has a dust or mold sensitivity/allergy that has led him to be congested during the winter months and seek out these OTC decongestants.

## 2021-06-17 NOTE — PROGRESS NOTES
"Assessment / Impression     1. Eczema     2. Allergic rhinitis     3. Chronic nasal congestion           Plan:     He was given a prescription for triamcinolone.  If this does not help improve his symptoms he will let me know.  He may need to see dermatology about this.  It is possible that the skin lesions are due to psoriasis rather than eczema since they are occurring in the anterior knees and the backs of his elbows.  The lesion on his right anterior knee has a silvery plaque-like appearance.  He will continue the Nasacort which has been helping with his chronic nasal congestion and allergy symptoms.  I personally reviewed the ENT notes from 4/17/18.    Subjective:      HPI: Fany Sage is a 42 y.o. male who has a history of allergic rhinitis and chronic nasal congestion over the past year presents to the clinic requesting a new prescription for eczema.  He describes having eczema rashes on both anterior knees and a spot on his back which have been very itchy.  He reports seeing a dermatologist about 3 years ago and was prescribed a steroid cream that helped.  He cannot remember the name of it.  He was recently seen by ENT for chronic nasal congestion symptoms and was diagnosed with rhinitis medicamentosa.  He has been taking Nasacort which has been helpful.        Review of Systems  All other systems reviewed and are negative.     History   Smoking Status     Current Every Day Smoker     Packs/day: 0.50     Years: 20.00   Smokeless Tobacco     Never Used       Family History   Problem Relation Age of Onset     Stroke Mother      Hypertension Mother      Heart disease Father      Hyperlipidemia Father      Hypertension Father        Objective:     /70 (Patient Site: Right Arm, Patient Position: Sitting, Cuff Size: Adult Regular)  Pulse 68  Temp 97.7  F (36.5  C) (Oral)   Resp 16  Ht 5' 7\" (1.702 m)  Wt 173 lb 4 oz (78.6 kg)  BMI 27.13 kg/m2  Physical Examination: General appearance - alert, well " appearing, and in no distress  Eyes:  extraocular eye movements intact  Mouth: mucous membranes moist  Skin: There is a small erythematous rash in both anterior knees.  Right lesion has a silvery plaque-like appearance.  There is a circular erythematous patch approximately 3 cm in diameter on his back.  No results found for this or any previous visit (from the past 168 hour(s)).    Current Outpatient Prescriptions   Medication Sig     atorvastatin (LIPITOR) 20 MG tablet TAKE ONE TABLET BY MOUTH ONE TIME DAILY      lisinopril-hydrochlorothiazide (PRINZIDE,ZESTORETIC) 20-25 mg per tablet TAKE ONE TABLET BY MOUTH ONE TIME DAILY      triamcinolone acetonide (NASACORT NASL) into each nostril as needed.     triamcinolone (KENALOG) 0.1 % cream Apply to affected area twice daily as needed.

## 2021-06-18 ENCOUNTER — OFFICE VISIT (OUTPATIENT)
Dept: ORTHOPEDICS | Facility: CLINIC | Age: 46
End: 2021-06-18
Payer: COMMERCIAL

## 2021-06-18 VITALS — HEART RATE: 67 BPM | SYSTOLIC BLOOD PRESSURE: 133 MMHG | DIASTOLIC BLOOD PRESSURE: 92 MMHG

## 2021-06-18 DIAGNOSIS — G56.21 ULNAR NEUROPATHY OF RIGHT UPPER EXTREMITY: Primary | ICD-10-CM

## 2021-06-18 PROCEDURE — 99212 OFFICE O/P EST SF 10 MIN: CPT | Performed by: FAMILY MEDICINE

## 2021-06-18 ASSESSMENT — PAIN SCALES - GENERAL: PAINLEVEL: NO PAIN (0)

## 2021-06-18 NOTE — LETTER
6/18/2021         RE: Fany Sage  [address]  [place] [address]        Dear Colleague,    Thank you for referring your patient, Fany Sage, to the [facility] [place]. Please see a copy of my visit note below.    ESTABLISHED PATIENT FOLLOW-UP:  Follow up for right hand pain after doing 1 hand therapy visit. Reporting some improvment    HISTORY OF PRESENT ILLNESS  Mr. Sage is a pleasant 45 year old year old male who presents to clinic today for follow-up of right hand    Date of injury: No  Date last seen: 5/7/21  Following Therapeutic Plan: yes  Pain:0   Function: some weakness, overall function OK   Interval History: 1 hand therapy session     Additional medical/Social/Surgical histories reviewed in [facility] and updated as appropriate.    REVIEW OF SYSTEMS (6/18/2021)  CONSTITUTIONAL: Denies fever and weight loss  GASTROINTESTINAL: Denies abdominal pain, nausea, vomiting  MUSCULOSKELETAL: See HPI  SKIN: Denies any recent rash or lesion  NEUROLOGICAL: Denies numbness or focal weakness     PHYSICAL EXAM  There were no vitals taken for this visit.    General  - normal appearance, in no obvious distress  Musculoskeletal -Right elbow  - inspection: normal joint alignment, no obvious deformity, no swelling  - palpation: no bony or soft tissue tenderness  - ROM:  160 deg flexion   0 deg extension   90 deg pronation   90 deg supination  - strength: 5/5  strength, 5/5 flexion, extension, pronation, supination  - special tests: Fifth finger flexion strength intact, wrist flexion strength intact 5/5.  (-) Tinel's  Neuro  - no sensory or motor deficit, grossly normal coordination, normal muscle tone  Skin  - no ecchymosis, erythema, warmth, or induration, no obvious rash  Psych  - interactive, appropriate, normal mood and affect     ASSESSMENT & PLAN  Mr. Sage is a 45 year old year old male who presents to clinic today for follow-up regarding cubital tunnel syndrome of right  elbow.    Diagnosis: Cubital tunnel syndrome    Overall his symptoms do continue to improve.  He feels he has improved weakness, normal strength and lack of provocation on exam today.  We discussed continuing his activity modifications to avoid prolonged elbow flexion which may include changing his workstation, keeping his elbow extended for sleeping patterns.    At this time if no recurrence occurs, we can hold off on EMG and continue these conservative measures.  If he does note weakness again, I would then recommend EMG and he can call and I will place the order without being seen.    All questions were answered and Fany will follow up PRN or after EMG if ordered    It was a pleasure seeing Fany.    Minor Bruce DO, [facility]  Primary Care Sports Medicine            Again, thank you for allowing me to participate in the care of your patient.        Sincerely,        Minor Bruce DO

## 2021-06-18 NOTE — PROGRESS NOTES
ESTABLISHED PATIENT FOLLOW-UP:  Follow up for right hand pain after doing 1 hand therapy visit. Reporting some improvment    HISTORY OF PRESENT ILLNESS  Mr. Sage is a pleasant 45 year old year old male who presents to clinic today for follow-up of right hand    Date of injury: No  Date last seen: 5/7/21  Following Therapeutic Plan: yes  Pain:0   Function: some weakness, overall function OK   Interval History: 1 hand therapy session     Additional medical/Social/Surgical histories reviewed in [facility] and updated as appropriate.    REVIEW OF SYSTEMS (6/18/2021)  CONSTITUTIONAL: Denies fever and weight loss  GASTROINTESTINAL: Denies abdominal pain, nausea, vomiting  MUSCULOSKELETAL: See HPI  SKIN: Denies any recent rash or lesion  NEUROLOGICAL: Denies numbness or focal weakness     PHYSICAL EXAM  There were no vitals taken for this visit.    General  - normal appearance, in no obvious distress  Musculoskeletal -Right elbow  - inspection: normal joint alignment, no obvious deformity, no swelling  - palpation: no bony or soft tissue tenderness  - ROM:  160 deg flexion   0 deg extension   90 deg pronation   90 deg supination  - strength: 5/5  strength, 5/5 flexion, extension, pronation, supination  - special tests: Fifth finger flexion strength intact, wrist flexion strength intact 5/5.  (-) Tinel's  Neuro  - no sensory or motor deficit, grossly normal coordination, normal muscle tone  Skin  - no ecchymosis, erythema, warmth, or induration, no obvious rash  Psych  - interactive, appropriate, normal mood and affect     ASSESSMENT & PLAN  Mr. Sage is a 45 year old year old male who presents to clinic today for follow-up regarding cubital tunnel syndrome of right elbow.    Diagnosis: Cubital tunnel syndrome    Overall his symptoms do continue to improve.  He feels he has improved weakness, normal strength and lack of provocation on exam today.  We discussed continuing his activity modifications to avoid prolonged elbow  flexion which may include changing his workstation, keeping his elbow extended for sleeping patterns.    At this time if no recurrence occurs, we can hold off on EMG and continue these conservative measures.  If he does note weakness again, I would then recommend EMG and he can call and I will place the order without being seen.    All questions were answered and Fany will follow up PRN or after EMG if ordered    It was a pleasure seeing Fany.    Minor Bruce DO, CAQSM  Primary Care Sports Medicine

## 2021-06-19 NOTE — LETTER
Letter by Minor Mcintosh DO at      Author: Minor Mcintosh DO Service: -- Author Type: --    Filed:  Encounter Date: 8/19/2019 Status: (Other)         Lizzy Soares,     Your records indicate you are due for a medication check. Dr Funez did send a 30 supply of your medication so you are not without it, giving you 30 days to schedule your appointment.     Thank You,   Dr Gi Bernal office

## 2021-06-23 NOTE — TELEPHONE ENCOUNTER
RN cannot approve Refill Request    RN can NOT refill this medication PCP messaged that patient is overdue for Labs.     Izabela Pope, Care Connection Triage/Med Refill 1/28/2019    Requested Prescriptions   Pending Prescriptions Disp Refills     lisinopril-hydrochlorothiazide (PRINZIDE,ZESTORETIC) 20-25 mg per tablet [Pharmacy Med Name: Lisinopril-Hydrochlorothiazide Oral Tablet 20-25 MG] 90 tablet 1     Sig: TAKE ONE TABLET BY MOUTH ONE TIME DAILY    Diuretics/Combination Diuretics Refill Protocol  Failed - 1/26/2019  9:14 AM       Failed - Serum Potassium in past 12 months     No results found for: LN-POTASSIUM         Failed - Serum Sodium in past 12 months     No results found for: LN-SODIUM         Failed - Serum Creatinine in past 12 months     Creatinine   Date Value Ref Range Status   10/20/2016 1.17 0.70 - 1.30 mg/dL Final            Passed - Visit with PCP or prescribing provider visit in past 12 months    Last office visit with prescriber/PCP: 5/2/2018 Minor Mcintosh DO OR same dept: 5/2/2018 Minor Mcintosh DO OR same specialty: 5/2/2018 Minor Mcintosh DO  Last physical: Visit date not found Last MTM visit: Visit date not found   Next visit within 3 mo: Visit date not found  Next physical within 3 mo: Visit date not found  Prescriber OR PCP: Minor Peters DO  Last diagnosis associated with med order: There are no diagnoses linked to this encounter.  If protocol passes may refill for 12 months if within 3 months of last provider visit (or a total of 15 months).            Passed - Blood pressure on file in past 12 months    BP Readings from Last 1 Encounters:   05/02/18 112/70

## 2021-07-13 PROBLEM — G56.21 CUBITAL TUNNEL SYNDROME ON RIGHT: Status: RESOLVED | Noted: 2021-05-17 | Resolved: 2021-07-13

## 2021-09-04 ENCOUNTER — HEALTH MAINTENANCE LETTER (OUTPATIENT)
Age: 46
End: 2021-09-04

## 2021-12-09 ENCOUNTER — IMMUNIZATION (OUTPATIENT)
Dept: NURSING | Facility: CLINIC | Age: 46
End: 2021-12-09
Payer: COMMERCIAL

## 2021-12-09 PROCEDURE — 0004A PR COVID VAC PFIZER DIL RECON 30 MCG/0.3 ML IM: CPT

## 2021-12-09 PROCEDURE — 91300 PR COVID VAC PFIZER DIL RECON 30 MCG/0.3 ML IM: CPT

## 2022-02-19 ENCOUNTER — HEALTH MAINTENANCE LETTER (OUTPATIENT)
Age: 47
End: 2022-02-19

## 2022-04-16 DIAGNOSIS — I10 ESSENTIAL HYPERTENSION: ICD-10-CM

## 2022-04-16 DIAGNOSIS — E78.2 MIXED HYPERLIPIDEMIA: ICD-10-CM

## 2022-04-18 RX ORDER — LISINOPRIL AND HYDROCHLOROTHIAZIDE 20; 25 MG/1; MG/1
1 TABLET ORAL DAILY
Qty: 90 TABLET | Refills: 0 | Status: SHIPPED | OUTPATIENT
Start: 2022-04-18 | End: 2022-08-04

## 2022-04-18 RX ORDER — ATORVASTATIN CALCIUM 20 MG/1
20 TABLET, FILM COATED ORAL DAILY
Qty: 90 TABLET | Refills: 0 | Status: SHIPPED | OUTPATIENT
Start: 2022-04-18 | End: 2022-08-04

## 2022-04-18 NOTE — TELEPHONE ENCOUNTER
Medication is being filled for 1 time refill only due to:  Patient needs to be seen because it has been more than one year since last visit. 90 day mackenzie refills given, visit and labs needed prior to additional refills.    Nitza Bishop RN  [facility]

## 2022-08-04 DIAGNOSIS — E78.2 MIXED HYPERLIPIDEMIA: ICD-10-CM

## 2022-08-04 DIAGNOSIS — I10 ESSENTIAL HYPERTENSION: ICD-10-CM

## 2022-08-04 RX ORDER — LISINOPRIL AND HYDROCHLOROTHIAZIDE 20; 25 MG/1; MG/1
TABLET ORAL
Qty: 30 TABLET | Refills: 0 | Status: SHIPPED | OUTPATIENT
Start: 2022-08-04 | End: 2022-08-26

## 2022-08-04 RX ORDER — ATORVASTATIN CALCIUM 20 MG/1
TABLET, FILM COATED ORAL
Qty: 30 TABLET | Refills: 0 | Status: SHIPPED | OUTPATIENT
Start: 2022-08-04 | End: 2022-08-26

## 2022-08-09 NOTE — TELEPHONE ENCOUNTER
Called patient to inform about refilled prescription and need for office visit with lab. Patient scheduled an appointment with Dr. Olson on 8/26 for the needed visit.    Whitney Hoover, Visit Facilitator

## 2022-08-26 ENCOUNTER — OFFICE VISIT (OUTPATIENT)
Dept: FAMILY MEDICINE | Facility: CLINIC | Age: 47
End: 2022-08-26
Payer: COMMERCIAL

## 2022-08-26 VITALS
TEMPERATURE: 98 F | DIASTOLIC BLOOD PRESSURE: 88 MMHG | SYSTOLIC BLOOD PRESSURE: 131 MMHG | OXYGEN SATURATION: 99 % | BODY MASS INDEX: 27.88 KG/M2 | WEIGHT: 177.6 LBS | HEIGHT: 67 IN | HEART RATE: 68 BPM

## 2022-08-26 DIAGNOSIS — Z13.1 SCREENING FOR DIABETES MELLITUS: ICD-10-CM

## 2022-08-26 DIAGNOSIS — E78.2 MIXED HYPERLIPIDEMIA: ICD-10-CM

## 2022-08-26 DIAGNOSIS — I10 ESSENTIAL HYPERTENSION: ICD-10-CM

## 2022-08-26 DIAGNOSIS — E78.1 HYPERTRIGLYCERIDEMIA: Primary | ICD-10-CM

## 2022-08-26 LAB
ANION GAP SERPL CALCULATED.3IONS-SCNC: 7 MMOL/L (ref 3–14)
BUN SERPL-MCNC: 21 MG/DL (ref 7–30)
CALCIUM SERPL-MCNC: 9.4 MG/DL (ref 8.5–10.1)
CHLORIDE BLD-SCNC: 101 MMOL/L (ref 94–109)
CHOLEST SERPL-MCNC: 209 MG/DL
CO2 SERPL-SCNC: 28 MMOL/L (ref 20–32)
CREAT SERPL-MCNC: 1.29 MG/DL (ref 0.66–1.25)
FASTING STATUS PATIENT QL REPORTED: NO
GFR SERPL CREATININE-BSD FRML MDRD: 68 ML/MIN/1.73M2
GLUCOSE BLD-MCNC: 103 MG/DL (ref 70–99)
HBA1C MFR BLD: 5.7 % (ref 0–5.6)
HDLC SERPL-MCNC: 37 MG/DL
LDLC SERPL CALC-MCNC: 145 MG/DL
NONHDLC SERPL-MCNC: 172 MG/DL
POTASSIUM BLD-SCNC: 4.2 MMOL/L (ref 3.4–5.3)
SODIUM SERPL-SCNC: 136 MMOL/L (ref 133–144)
TRIGL SERPL-MCNC: 137 MG/DL

## 2022-08-26 PROCEDURE — 99214 OFFICE O/P EST MOD 30 MIN: CPT | Performed by: INTERNAL MEDICINE

## 2022-08-26 PROCEDURE — 80048 BASIC METABOLIC PNL TOTAL CA: CPT | Performed by: INTERNAL MEDICINE

## 2022-08-26 PROCEDURE — 80061 LIPID PANEL: CPT | Performed by: INTERNAL MEDICINE

## 2022-08-26 PROCEDURE — 36415 COLL VENOUS BLD VENIPUNCTURE: CPT | Performed by: INTERNAL MEDICINE

## 2022-08-26 PROCEDURE — 83036 HEMOGLOBIN GLYCOSYLATED A1C: CPT | Performed by: INTERNAL MEDICINE

## 2022-08-26 RX ORDER — LISINOPRIL AND HYDROCHLOROTHIAZIDE 20; 25 MG/1; MG/1
1 TABLET ORAL DAILY
Qty: 90 TABLET | Refills: 3 | Status: SHIPPED | OUTPATIENT
Start: 2022-08-26 | End: 2023-09-05

## 2022-08-26 RX ORDER — ATORVASTATIN CALCIUM 40 MG/1
40 TABLET, FILM COATED ORAL DAILY
Qty: 90 TABLET | Refills: 3 | Status: SHIPPED | OUTPATIENT
Start: 2022-08-26 | End: 2023-09-05

## 2022-08-26 RX ORDER — OMEGA-3-ACID ETHYL ESTERS 1 G/1
2 CAPSULE, LIQUID FILLED ORAL 2 TIMES DAILY
Qty: 180 CAPSULE | Refills: 1 | Status: SHIPPED | OUTPATIENT
Start: 2022-08-26 | End: 2023-03-17

## 2022-08-26 ASSESSMENT — PAIN SCALES - GENERAL: PAINLEVEL: NO PAIN (0)

## 2022-08-26 NOTE — PROGRESS NOTES
The 10-year ASCVD risk score (Nadine TOLEDO Jr., et al., 2013) is: 12%    Values used to calculate the score:      Age: 47 years      Sex: Male      Is Non- : No      Diabetic: No      Tobacco smoker: Yes      Systolic Blood Pressure: 131 mmHg      Is BP treated: Yes      HDL Cholesterol: 38 mg/dL      Total Cholesterol: 213 mg/dL  Assessment & Plan     Essential hypertension  Blood pressure has been stable on the current regimen  Continue the lisinopril hydrochlorothiazide combination  - lisinopril-hydrochlorothiazide (ZESTORETIC) 20-25 MG tablet; Take 1 tablet by mouth daily  - Basic metabolic panel  (Ca, Cl, CO2, Creat, Gluc, K, Na, BUN); Future  - Basic metabolic panel  (Ca, Cl, CO2, Creat, Gluc, K, Na, BUN)    Hypertriglyceridemia  Triglycerides hovering around 500  He is only taking Lipitor 20 mg daily  Increase Lipitor to 40 mg  Add Lovaza  I cannot use a fibrate because of the risk of myopathy  ASCVD risk is close to 12% so needs to be on statin  - omega-3 acid ethyl esters (LOVAZA) 1 g capsule; Take 2 capsules (2 g) by mouth 2 times daily  - Lipid panel reflex to direct LDL Non-fasting; Future  - Lipid panel reflex to direct LDL Non-fasting    Mixed hyperlipidemia  As above  - atorvastatin (LIPITOR) 40 MG tablet; Take 1 tablet (40 mg) by mouth daily  - Lipid panel reflex to direct LDL Non-fasting; Future  - Lipid panel reflex to direct LDL Non-fasting    Screening for diabetes mellitus  Fasting glucose was high in the past I will check an A1c to make sure he does not have any secondary causes for hypertriglyceridemia  - Hemoglobin A1c; Future  - Hemoglobin A1c      30 minutes spent on the date of the encounter doing chart review, history and exam, documentation and further activities per the note       Nicotine/Tobacco Cessation:  He reports that he has been smoking. He has been smoking about 0.50 packs per day. He has never used smokeless tobacco.  Nicotine/Tobacco Cessation Plan:  "  Information offered: Patient not interested at this time      BMI:   Estimated body mass index is 27.63 kg/m  as calculated from the following:    Height as of this encounter: 1.707 m (5' 7.22\").    Weight as of this encounter: 80.6 kg (177 lb 9.6 oz).   Weight management plan: Discussed healthy diet and exercise guidelines        Return in about 6 months (around 2/26/2023).    Sergio Olson MD  [facility] Soares is a 47 year old, presenting for the following health issues:  Hypertension      History of Present Illness       Hypertension: He presents for follow up of hypertension.  He does not check blood pressure  regularly outside of the clinic. Outside blood pressures have been over 140/90. He follows a low salt diet.               Review of Systems   Constitutional, HEENT, cardiovascular, pulmonary, gi and gu systems are negative, except as otherwise noted.      Objective    /88 (BP Location: Left arm, Patient Position: Sitting, Cuff Size: Adult Large)   Pulse 68   Temp 98  F (36.7  C) (Tympanic)   Ht 1.707 m (5' 7.22\")   Wt 80.6 kg (177 lb 9.6 oz)   SpO2 99%   BMI 27.63 kg/m    Body mass index is 27.63 kg/m .  Physical Exam   GENERAL: healthy, alert and no distress  NECK: no adenopathy, no asymmetry, masses, or scars and thyroid normal to palpation  RESP: lungs clear to auscultation - no rales, rhonchi or wheezes  CV: regular rate and rhythm, normal S1 S2, no S3 or S4, no murmur, click or rub, no peripheral edema and peripheral pulses strong  ABDOMEN: soft, nontender, no hepatosplenomegaly, no masses and bowel sounds normal  MS: no gross musculoskeletal defects noted, no edema                    .  ..  "

## 2022-10-22 ENCOUNTER — HEALTH MAINTENANCE LETTER (OUTPATIENT)
Age: 47
End: 2022-10-22

## 2022-10-28 ENCOUNTER — IMMUNIZATION (OUTPATIENT)
Dept: NURSING | Facility: CLINIC | Age: 47
End: 2022-10-28
Payer: COMMERCIAL

## 2022-10-28 PROCEDURE — 90471 IMMUNIZATION ADMIN: CPT

## 2022-10-28 PROCEDURE — 90686 IIV4 VACC NO PRSV 0.5 ML IM: CPT

## 2023-03-17 ENCOUNTER — OFFICE VISIT (OUTPATIENT)
Dept: FAMILY MEDICINE | Facility: CLINIC | Age: 48
End: 2023-03-17
Payer: COMMERCIAL

## 2023-03-17 VITALS
HEART RATE: 75 BPM | SYSTOLIC BLOOD PRESSURE: 139 MMHG | DIASTOLIC BLOOD PRESSURE: 100 MMHG | WEIGHT: 181.6 LBS | OXYGEN SATURATION: 99 % | TEMPERATURE: 97.9 F | RESPIRATION RATE: 16 BRPM | BODY MASS INDEX: 28.5 KG/M2 | HEIGHT: 67 IN

## 2023-03-17 DIAGNOSIS — Z13.1 SCREENING FOR DIABETES MELLITUS: ICD-10-CM

## 2023-03-17 DIAGNOSIS — Z00.00 ROUTINE GENERAL MEDICAL EXAMINATION AT A HEALTH CARE FACILITY: Primary | ICD-10-CM

## 2023-03-17 DIAGNOSIS — F17.200 NICOTINE DEPENDENCE, UNCOMPLICATED, UNSPECIFIED NICOTINE PRODUCT TYPE: ICD-10-CM

## 2023-03-17 DIAGNOSIS — Z13.220 SCREENING FOR HYPERLIPIDEMIA: ICD-10-CM

## 2023-03-17 DIAGNOSIS — H81.10 BENIGN PAROXYSMAL POSITIONAL VERTIGO, UNSPECIFIED LATERALITY: ICD-10-CM

## 2023-03-17 DIAGNOSIS — Z12.11 COLON CANCER SCREENING: ICD-10-CM

## 2023-03-17 LAB
ANION GAP SERPL CALCULATED.3IONS-SCNC: 3 MMOL/L (ref 3–14)
BUN SERPL-MCNC: 17 MG/DL (ref 7–30)
CALCIUM SERPL-MCNC: 9.1 MG/DL (ref 8.5–10.1)
CHLORIDE BLD-SCNC: 108 MMOL/L (ref 94–109)
CHOLEST SERPL-MCNC: 158 MG/DL
CO2 SERPL-SCNC: 29 MMOL/L (ref 20–32)
CREAT SERPL-MCNC: 1.21 MG/DL (ref 0.66–1.25)
FASTING STATUS PATIENT QL REPORTED: YES
GFR SERPL CREATININE-BSD FRML MDRD: 74 ML/MIN/1.73M2
GLUCOSE BLD-MCNC: 111 MG/DL (ref 70–99)
HBA1C MFR BLD: 5.9 % (ref 0–5.6)
HDLC SERPL-MCNC: 29 MG/DL
LDLC SERPL CALC-MCNC: 73 MG/DL
LDLC SERPL CALC-MCNC: ABNORMAL MG/DL
NONHDLC SERPL-MCNC: 129 MG/DL
POTASSIUM BLD-SCNC: 4 MMOL/L (ref 3.4–5.3)
SODIUM SERPL-SCNC: 140 MMOL/L (ref 133–144)
TRIGL SERPL-MCNC: 433 MG/DL

## 2023-03-17 PROCEDURE — 99396 PREV VISIT EST AGE 40-64: CPT | Performed by: INTERNAL MEDICINE

## 2023-03-17 PROCEDURE — 83036 HEMOGLOBIN GLYCOSYLATED A1C: CPT | Performed by: INTERNAL MEDICINE

## 2023-03-17 PROCEDURE — 36415 COLL VENOUS BLD VENIPUNCTURE: CPT | Performed by: INTERNAL MEDICINE

## 2023-03-17 PROCEDURE — 80048 BASIC METABOLIC PNL TOTAL CA: CPT | Performed by: INTERNAL MEDICINE

## 2023-03-17 PROCEDURE — 83721 ASSAY OF BLOOD LIPOPROTEIN: CPT | Mod: 59 | Performed by: INTERNAL MEDICINE

## 2023-03-17 PROCEDURE — 80061 LIPID PANEL: CPT | Performed by: INTERNAL MEDICINE

## 2023-03-17 ASSESSMENT — ENCOUNTER SYMPTOMS
COUGH: 0
HEMATURIA: 0
MYALGIAS: 0
SHORTNESS OF BREATH: 0
JOINT SWELLING: 0
DYSURIA: 0
CHILLS: 0
SORE THROAT: 0
WEAKNESS: 0
CONSTIPATION: 0
DIARRHEA: 0
HEARTBURN: 0
NERVOUS/ANXIOUS: 0
EYE PAIN: 0
PARESTHESIAS: 0
HEADACHES: 0
FEVER: 0
FREQUENCY: 0
HEMATOCHEZIA: 0
ABDOMINAL PAIN: 0
DIZZINESS: 1
ARTHRALGIAS: 0
PALPITATIONS: 0
NAUSEA: 0

## 2023-03-17 NOTE — PROGRESS NOTES
SUBJECTIVE:   CC: Fany is an 47 year old who presents for preventative health visit.     Patient has been advised of split billing requirements and indicates understanding: Yes  Healthy Habits:     Getting at least 3 servings of Calcium per day:  NO    Bi-annual eye exam:  NO    Dental care twice a year:  Yes    Sleep apnea or symptoms of sleep apnea:  Excessive snoring and Sleep apnea    Diet:  Regular (no restrictions)    Frequency of exercise:  None    Taking medications regularly:  Yes    Medication side effects:  None    PHQ-2 Total Score: 0    Additional concerns today:  No          Dizziness      Duration: on and off for the past 5-6 months    Description   Feeling faint:  no   Feeling like the surroundings are moving: YES  Loss of consciousness or falls: no     Intensity:  moderate    Accompanying signs and symptoms:   Nausea/vomitting: no   Palpitations: no   Weakness in arms or legs: no   Vision or speech changes: no   Ringing in ears (Tinnitus): no   Hearing loss related to dizziness: no   Other (fevers/chills/sweating/dyspnea): no     History (similar episodes/head trauma/previous evaluation/recent bleeding): None    Precipitating or alleviating factors (new meds/chemicals): None  Worse with activity/head movement: YES    Therapies tried and outcome: None      Today's PHQ-2 Score:   PHQ-2 ( 1999 Pfizer) 3/17/2023   Q1: Little interest or pleasure in doing things 0   Q2: Feeling down, depressed or hopeless 0   PHQ-2 Score 0   PHQ-2 Total Score (12-17 Years)- Positive if 3 or more points; Administer PHQ-A if positive -   Q1: Little interest or pleasure in doing things Not at all   Q2: Feeling down, depressed or hopeless Not at all   PHQ-2 Score 0       Have you ever done Advance Care Planning? (For example, a Health Directive, POLST, or a discussion with a medical provider or your loved ones about your wishes): No, advance care planning information given to patient to review.  Patient declined advance care  "planning discussion at this time.    Social History     Tobacco Use     Smoking status: Every Day     Packs/day: 0.50     Years: 20.00     Pack years: 10.00     Types: Cigarettes     Smokeless tobacco: Never   Substance Use Topics     Alcohol use: Not Currently     Comment: occasionally         Alcohol Use 3/17/2023   Prescreen: >3 drinks/day or >7 drinks/week? No   No flowsheet data found.    Last PSA: No results found for: PSA    Reviewed orders with patient. Reviewed health maintenance and updated orders accordingly - Yes  Lab work is in process    Reviewed and updated as needed this visit by clinical staff   Tobacco  Allergies  Meds              Reviewed and updated as needed this visit by Provider                     Review of Systems  CONSTITUTIONAL: NEGATIVE for fever, chills, change in weight  INTEGUMENTARY/SKIN: NEGATIVE for worrisome rashes, moles or lesions  EYES: NEGATIVE for vision changes or irritation  ENT: NEGATIVE for ear, mouth and throat problems  RESP: NEGATIVE for significant cough or SOB  CV: NEGATIVE for chest pain, palpitations or peripheral edema  GI: NEGATIVE for nausea, abdominal pain, heartburn, or change in bowel habits   male: negative for dysuria, hematuria, decreased urinary stream, erectile dysfunction, urethral discharge  MUSCULOSKELETAL: NEGATIVE for significant arthralgias or myalgia  NEURO: dizziness/lightheadedness  PSYCHIATRIC: NEGATIVE for changes in mood or affect    OBJECTIVE:   BP (!) 146/98 (BP Location: Left arm, Patient Position: Sitting, Cuff Size: Adult Large)   Pulse 75   Temp 97.9  F (36.6  C) (Oral)   Resp 16   Ht 1.707 m (5' 7.2\")   Wt 82.4 kg (181 lb 9.6 oz)   SpO2 99%   BMI 28.27 kg/m      Physical Exam  GENERAL: healthy, alert and no distress  EYES: Eyes grossly normal to inspection, PERRL and conjunctivae and sclerae normal  HENT: ear canals and TM's normal, nose and mouth without ulcers or lesions  NECK: no adenopathy, no asymmetry, masses, or " scars and thyroid normal to palpation  RESP: lungs clear to auscultation - no rales, rhonchi or wheezes  CV: regular rate and rhythm, normal S1 S2, no S3 or S4, no murmur, click or rub, no peripheral edema and peripheral pulses strong  ABDOMEN: soft, nontender, no hepatosplenomegaly, no masses and bowel sounds normal  MS: no gross musculoskeletal defects noted, no edema  SKIN: no suspicious lesions or rashes  NEURO: Normal strength and tone, mentation intact and speech normal  NEURO: He has some transient dizziness whenever he turns his head to the right or left  PSYCH: mentation appears normal, affect normal/bright    Diagnostic Test Results:  Labs reviewed in Epic    ASSESSMENT/PLAN:   (Z00.00) Routine general medical examination at a health care facility  (primary encounter diagnosis)  Comment: He is in good physical health  Never had a colonoscopy  I will order one on this visit  We discussed about colon cancer screening  We discussed about pneumonia vaccination  He declined that  His blood pressure is elevated today that is because he has not taken his medications for the last few days  Plan: Basic metabolic panel  (Ca, Cl, CO2, Creat,         Gluc, K, Na, BUN)            (Z13.1) Screening for diabetes mellitus  Comment:   Plan: Hemoglobin A1c        His A1c was 5.7 at last visit  He is working on diet and exercise  Repeat A1c today    (Z13.220) Screening for hyperlipidemia  Comment: He does have a history of high triglycerides and high LDL and low HDL  He was on Lovaza at some point  He is currently  not on Lovaza and just taking Lipitor  Plan: Lipid panel reflex to direct LDL Fasting            (H81.10) Benign paroxysmal positional vertigo, unspecified laterality  Comment: Complaining of transient episodes of lightheadedness whenever he  gets up from sitting to standing posture or goes from standing to sitting posture or whenever he turns his head  These last very briefly about 10 seconds  I was able to  "replicate them in the office today  He has brief moments of dizziness/vertigo symptoms whenever he turns his head towards the right or left suddenly  Reassured most probably BPPV  If symptoms are getting worse we can consider physical therapy  Plan:     Patient has been advised of split billing requirements and indicates understanding: No      COUNSELING:   Reviewed preventive health counseling, as reflected in patient instructions       Regular exercise       Healthy diet/nutrition       Vision screening       Immunizations    Declined: Pneumococcal due to Other                Colorectal cancer screening      BMI:   Estimated body mass index is 28.27 kg/m  as calculated from the following:    Height as of this encounter: 1.707 m (5' 7.2\").    Weight as of this encounter: 82.4 kg (181 lb 9.6 oz).   Weight management plan: Discussed healthy diet and exercise guidelines      He reports that he has been smoking cigarettes. He has a 10.00 pack-year smoking history. He has never used smokeless tobacco.  Nicotine/Tobacco Cessation Plan:   Information offered: Patient not interested at this time            Sergio Olson MD  [facility]  "

## 2023-03-17 NOTE — PATIENT INSTRUCTIONS
At [facility], we strive to deliver an exceptional experience to you, every time we see you. If you receive a survey, please complete it as we do value your feedback.  If you have MyChart, you can expect to receive results automatically within 24 hours of their completion.  Your provider will send a note interpreting your results as well.   If you do not have MyChart, you should receive your results in about a week by mail.    Your care team:                            Family Medicine Internal Medicine   MD Santiago Sanchez MD Shantel Branch-Fleming, MD Srinivasa Vaka, MD Katya Belousova, PAKACIE Topete CNP, MD ([facility]) [facility]   Hosea Romero, MD Liz Mcelroy MD Amelia Massimini APRN MIKE Hitchcock APRN MD Zackery Bennett MD          Clinic hours: Monday - Thursday 7 am-6 pm; Fridays 7 am-5 pm.   Urgent care: Monday - Friday 10 am- 8 pm; Saturday and Sunday 9 am-5 pm.    Clinic: [phone]       [facility]: Monday - Thursday 8 am - 7 pm; Friday 8 am - 6 pm  [facility]: [phone]

## 2023-03-20 ENCOUNTER — TELEPHONE (OUTPATIENT)
Dept: FAMILY MEDICINE | Facility: CLINIC | Age: 48
End: 2023-03-20
Payer: COMMERCIAL

## 2023-03-20 DIAGNOSIS — F17.200 NICOTINE DEPENDENCE, UNCOMPLICATED, UNSPECIFIED NICOTINE PRODUCT TYPE: ICD-10-CM

## 2023-03-20 DIAGNOSIS — E78.1 HYPERTRIGLYCERIDEMIA: ICD-10-CM

## 2023-03-20 DIAGNOSIS — E78.1 HYPERTRIGLYCERIDEMIA: Primary | ICD-10-CM

## 2023-03-20 RX ORDER — OMEGA-3-ACID ETHYL ESTERS 1 G/1
2 CAPSULE, LIQUID FILLED ORAL 2 TIMES DAILY
Qty: 180 CAPSULE | Refills: 1 | Status: SHIPPED | OUTPATIENT
Start: 2023-03-20 | End: 2023-03-23

## 2023-03-21 NOTE — TELEPHONE ENCOUNTER
omega-3 acid ethyl esters (LOVAZA) 1 g capsule  2 g, 2 TIMES DAILY 1 ordered                 Med not covered - requires PA

## 2023-03-22 NOTE — TELEPHONE ENCOUNTER
Central Prior Authorization Team   Phone: [phone]    PA Initiation    Medication: omega-3 acid ethyl esters (LOVAZA) 1 g capsule  Insurance Company: [employer] - Phone [phone] Fax [phone]  Pharmacy Filling the Rx: [id number] - [place], [place] - [address]  Filling Pharmacy Phone: [phone]  Filling Pharmacy Fax:    Start Date: 3/22/2023

## 2023-03-23 RX ORDER — FENOFIBRATE 145 MG/1
145 TABLET, COATED ORAL DAILY
Qty: 90 TABLET | Refills: 3 | Status: SHIPPED | OUTPATIENT
Start: 2023-03-23 | End: 2024-01-19

## 2023-03-23 NOTE — TELEPHONE ENCOUNTER
PRIOR AUTHORIZATION DENIED    Medication: omega-3 acid ethyl esters (LOVAZA) 1 g capsule    Denial Date: 3/23/2023    Denial Rational:  Patient must have a history of trial & failure to the formulary alternative(s) or have a contraindication or intolerance to the formulary alternatives: Fenofibrate.           Appeal Information:    If you would like to appeal, please supply P/A team with a letter of medical necessity with clinical reason.

## 2023-03-27 ENCOUNTER — TELEPHONE (OUTPATIENT)
Dept: FAMILY MEDICINE | Facility: CLINIC | Age: 48
End: 2023-03-27
Payer: COMMERCIAL

## 2023-03-27 NOTE — TELEPHONE ENCOUNTER
Please call the pharmacy and get more information on this  I am not sure what prior authorization they are talking about   Is this for Tricor?  I have previously prescribed fish liver oil supplements but since they are not covered I canceled the prescription

## 2023-03-27 NOTE — TELEPHONE ENCOUNTER
General Call    Contacts       Type Contact Phone/Fax    03/27/2023 09:17 AM CDT Phone (Incoming) [id number] - [place], [place] - [address] (Pharmacy) [phone]        Reason for Call:  Prior Auth    What are your questions or concerns:  Pharmacy received prescription but no prior authorization. Please send or notify them if pt should be paying out of pocket.     Date of last appointment with provider: 3/17/23    Could we send this information to you in [employer] or would you prefer to receive a phone call?:   Patient would prefer a phone call   Okay to leave a detailed message?: Yes at Other phone number:  [phone]

## 2023-03-27 NOTE — TELEPHONE ENCOUNTER
Called pharmacy, pharmacist mentioned that the Nicotrol inhaler requires a PA. The Tricor is covered and has already been picked up.    Routing to provider to review and advise - would you like PA on Nicotrol inhaler or would you like to try alternative?      Sandy Grimes, DANIELN, RN  [facility]

## 2023-03-27 NOTE — TELEPHONE ENCOUNTER
Looks like nicotine gum and nicotine patches are also not covered by this patient's plan  There is not much we can do

## 2023-05-23 ENCOUNTER — TELEPHONE (OUTPATIENT)
Dept: FAMILY MEDICINE | Facility: CLINIC | Age: 48
End: 2023-05-23
Payer: COMMERCIAL

## 2023-05-23 NOTE — LETTER
[address] [id number] [phone]  Dept: [phone]    May 23, 2023    Fany Sage  [address]  [place] [address]    Dear Fany,    At [facility] we care about your health and are committed to providing quality patient care.     Here is a list of Health Maintenance topics that are due now or due soon:  Health Maintenance Due   Topic Date Due    HEPATITIS B IMMUNIZATION (1 of 3 - 3-dose series) Never done    Pneumococcal Vaccine: Pediatrics (0 to 5 Years) and At-Risk Patients (6 to 64 Years) (1 - PCV) Never done    COLORECTAL CANCER SCREENING  Never done    COVID-19 Vaccine (4 - Pfizer series) 02/03/2022        We are recommending that you:  Schedule a COLONOSCOPY to assess for colon cancer (due every 10 years or 5 years in higher risk situations.)       Colon cancer is now the second leading cause of cancer-related deaths in the [place] for both men and women and there are over 130,000 new cases and 50,000 deaths per year from colon cancer.  Colonoscopies can prevent 90-95% of these deaths.  Problem lesions can be removed before they ever become cancer.  This test is not only looking for cancer, but also getting rid of precancerious lesions.    If you are under/uninsured, we recommend you contact the DepoMeds program. DepoMeds is a free colorectal cancer screening program that provides colonoscopies for eligible under/uninsured [place] men and women. If you are interested in receiving a free colonoscopy, please call [employer] at [phone] (mention code [employer]) to see if you re eligible.     If you do not wish to do a colonoscopy or cannot afford to do one, at this time, there is another option. It is called a FIT test or Fecal Immunochemical Occult Blood Test (take home stool sample kit).  It does not replace the colonoscopy for colorectal cancer screening, but it can detect hidden  bleeding in the lower colon.  It does need to be repeated every year and if a positive result is obtained, you would be referred for a colonoscopy.    If you have completed either one of these tests at another facility, please call/respond to this message with the details of when and where the tests were done and if they were normal or not. Or have the records sent to our clinic so that we can best coordinate your care. and   Schedule a Nurse-Only appointment to update your immunizations: Your records indicate that you are not up to date with your immunizations, please schedule a nurse-only appointment to get these updated or update them at your next office visit. If this is incorrect, please disregard.    To schedule an appointment or discuss this further, you may contact us by phone at the [facility] at [phone] or online through the patient [email or web address] @ [email or web address]    Thank you for trusting [facility] and we appreciate the opportunity to serve you.  We look forward to supporting your healthcare needs in the future.    Your partners in health,      Quality Committee at [employer]

## 2023-05-23 NOTE — TELEPHONE ENCOUNTER
Patient Quality Outreach    Patient is due for the following:   Colon Cancer Screening      Topic Date Due     Hepatitis B Vaccine (1 of 3 - 3-dose series) Never done     Pneumococcal Vaccine (1 - PCV) Never done     COVID-19 Vaccine (4 - Pfizer series) 02/03/2022       Next Steps:   Schedule a nurse only visit for Colonoscopy Screening and Immunization     Type of outreach:    Sent [employer] message. and Sent letter.      Questions for provider review:    None           Sabrina Grullon MA

## 2023-09-05 DIAGNOSIS — I10 ESSENTIAL HYPERTENSION: ICD-10-CM

## 2023-09-05 DIAGNOSIS — E78.2 MIXED HYPERLIPIDEMIA: ICD-10-CM

## 2023-09-05 RX ORDER — LISINOPRIL AND HYDROCHLOROTHIAZIDE 20; 25 MG/1; MG/1
1 TABLET ORAL DAILY
Qty: 90 TABLET | Refills: 0 | Status: SHIPPED | OUTPATIENT
Start: 2023-09-05 | End: 2024-01-19

## 2023-09-05 RX ORDER — ATORVASTATIN CALCIUM 40 MG/1
40 TABLET, FILM COATED ORAL DAILY
Qty: 90 TABLET | Refills: 1 | Status: SHIPPED | OUTPATIENT
Start: 2023-09-05 | End: 2024-01-19

## 2023-11-28 ENCOUNTER — TELEPHONE (OUTPATIENT)
Dept: FAMILY MEDICINE | Facility: CLINIC | Age: 48
End: 2023-11-28
Payer: COMMERCIAL

## 2023-11-28 NOTE — LETTER
[facility] [place] [facility]  [address] [address] [facility] [address]  [phone]  Dept: [phone]    November 28, 2023    Fany Sage  [address]  [place] [address]    Dear Fany,    At [facility] we care about your health and are committed to providing quality patient care.     Here is a list of Health Maintenance topics that are due now or due soon:  Health Maintenance Due   Topic Date Due    HEPATITIS B IMMUNIZATION (1 of 3 - 3-dose series) Never done    Pneumococcal Vaccine: Pediatrics (0 to 5 Years) and At-Risk Patients (6 to 64 Years) (1 - PCV) Never done    COLORECTAL CANCER SCREENING  Never done    ANNUAL REVIEW OF HM ORDERS  08/26/2023    INFLUENZA VACCINE (1) 09/01/2023    COVID-19 Vaccine (4 - 2023-24 season) 09/01/2023        We are recommending that you:  Schedule a COLONOSCOPY to assess for colon cancer (due every 10 years or 5 years in higher risk situations.)       Colon cancer is now the second leading cause of cancer-related deaths in the [place] for both men and women and there are over 130,000 new cases and 50,000 deaths per year from colon cancer.  Colonoscopies can prevent 90-95% of these deaths.  Problem lesions can be removed before they ever become cancer.  This test is not only looking for cancer, but also getting rid of precancerious lesions.    If you are under/uninsured, we recommend you contact the [employer] program. [employer] is a free colorectal cancer screening program that provides colonoscopies for eligible under/uninsured [place] men and women. If you are interested in receiving a free colonoscopy, please call [employer] at [phone] (mention code Sc[employer]Web) to see if you re eligible.     If you do not wish to do a colonoscopy or cannot afford to do one, at this time, there is another option. It is called a FIT test or Fecal Immunochemical Occult Blood Test (take home stool sample kit).   It does not replace the colonoscopy for colorectal cancer screening, but it can detect hidden bleeding in the lower colon.  It does need to be repeated every year and if a positive result is obtained, you would be referred for a colonoscopy.    If you have completed either one of these tests at another facility, please call/respond to this message with the details of when and where the tests were done and if they were normal or not. Or have the records sent to our clinic so that we can best coordinate your care.    To schedule an appointment or discuss this further, you may contact us by phone at the [facility] at [phone] or online through the patient [email or web address] @ [email or web address]    Thank you for trusting [facility] and we appreciate the opportunity to serve you.  We look forward to supporting your healthcare needs in the future.    Your partners in health,      Quality Committee at [facility]

## 2023-11-28 NOTE — TELEPHONE ENCOUNTER
.Patient Quality Outreach    Patient is due for the following:   Colon Cancer Screening    Next Steps:   Colon Cancer Screening    Type of outreach:    Sent letter.      Questions for provider review:    None           Marysol Wilder MA

## 2023-12-29 ENCOUNTER — DOCUMENTATION ONLY (OUTPATIENT)
Dept: FAMILY MEDICINE | Facility: CLINIC | Age: 48
End: 2023-12-29

## 2023-12-29 DIAGNOSIS — Z13.1 SCREENING FOR DIABETES MELLITUS: ICD-10-CM

## 2023-12-29 DIAGNOSIS — I10 ESSENTIAL HYPERTENSION: Primary | ICD-10-CM

## 2023-12-29 DIAGNOSIS — E78.1 HYPERTRIGLYCERIDEMIA: ICD-10-CM

## 2024-01-15 ENCOUNTER — LAB (OUTPATIENT)
Dept: LAB | Facility: CLINIC | Age: 49
End: 2024-01-15
Payer: COMMERCIAL

## 2024-01-15 DIAGNOSIS — I10 ESSENTIAL HYPERTENSION: ICD-10-CM

## 2024-01-15 DIAGNOSIS — Z13.1 SCREENING FOR DIABETES MELLITUS: ICD-10-CM

## 2024-01-15 DIAGNOSIS — E78.1 HYPERTRIGLYCERIDEMIA: ICD-10-CM

## 2024-01-15 LAB
ALBUMIN SERPL BCG-MCNC: 4.4 G/DL (ref 3.5–5.2)
ALP SERPL-CCNC: 75 U/L (ref 40–150)
ALT SERPL W P-5'-P-CCNC: 43 U/L (ref 0–70)
ANION GAP SERPL CALCULATED.3IONS-SCNC: 7 MMOL/L (ref 7–15)
AST SERPL W P-5'-P-CCNC: 35 U/L (ref 0–45)
BILIRUB SERPL-MCNC: 0.5 MG/DL
BUN SERPL-MCNC: 17.3 MG/DL (ref 6–20)
CALCIUM SERPL-MCNC: 9.4 MG/DL (ref 8.6–10)
CHLORIDE SERPL-SCNC: 103 MMOL/L (ref 98–107)
CHOLEST SERPL-MCNC: 236 MG/DL
CREAT SERPL-MCNC: 1.15 MG/DL (ref 0.67–1.17)
DEPRECATED HCO3 PLAS-SCNC: 28 MMOL/L (ref 22–29)
EGFRCR SERPLBLD CKD-EPI 2021: 78 ML/MIN/1.73M2
FASTING STATUS PATIENT QL REPORTED: YES
GLUCOSE SERPL-MCNC: 103 MG/DL (ref 70–99)
HBA1C MFR BLD: 5.3 % (ref 0–5.6)
HDLC SERPL-MCNC: 30 MG/DL
LDLC SERPL CALC-MCNC: 164 MG/DL
NONHDLC SERPL-MCNC: 206 MG/DL
POTASSIUM SERPL-SCNC: 4 MMOL/L (ref 3.4–5.3)
PROT SERPL-MCNC: 7.3 G/DL (ref 6.4–8.3)
SODIUM SERPL-SCNC: 138 MMOL/L (ref 135–145)
TRIGL SERPL-MCNC: 210 MG/DL

## 2024-01-15 PROCEDURE — 80061 LIPID PANEL: CPT

## 2024-01-15 PROCEDURE — 36415 COLL VENOUS BLD VENIPUNCTURE: CPT

## 2024-01-15 PROCEDURE — 80053 COMPREHEN METABOLIC PANEL: CPT

## 2024-01-15 PROCEDURE — 83036 HEMOGLOBIN GLYCOSYLATED A1C: CPT

## 2024-01-15 ASSESSMENT — ENCOUNTER SYMPTOMS
SORE THROAT: 0
PALPITATIONS: 0
COUGH: 0
DYSURIA: 0
WEAKNESS: 0
EYE PAIN: 0
NERVOUS/ANXIOUS: 0
MYALGIAS: 0
CONSTIPATION: 0
ABDOMINAL PAIN: 0
CHILLS: 0
ARTHRALGIAS: 0
DIARRHEA: 0
PARESTHESIAS: 0
FREQUENCY: 0
FEVER: 0
JOINT SWELLING: 0
HEMATURIA: 0
HEMATOCHEZIA: 0
NAUSEA: 0
DIZZINESS: 0
HEARTBURN: 0
HEADACHES: 0
SHORTNESS OF BREATH: 0

## 2024-01-19 ENCOUNTER — OFFICE VISIT (OUTPATIENT)
Dept: FAMILY MEDICINE | Facility: CLINIC | Age: 49
End: 2024-01-19
Payer: COMMERCIAL

## 2024-01-19 VITALS
DIASTOLIC BLOOD PRESSURE: 87 MMHG | OXYGEN SATURATION: 100 % | TEMPERATURE: 97.4 F | WEIGHT: 157.2 LBS | HEIGHT: 67 IN | SYSTOLIC BLOOD PRESSURE: 133 MMHG | HEART RATE: 57 BPM | BODY MASS INDEX: 24.67 KG/M2 | RESPIRATION RATE: 15 BRPM

## 2024-01-19 DIAGNOSIS — E78.1 HYPERTRIGLYCERIDEMIA: ICD-10-CM

## 2024-01-19 DIAGNOSIS — E78.2 MIXED HYPERLIPIDEMIA: ICD-10-CM

## 2024-01-19 DIAGNOSIS — I10 ESSENTIAL HYPERTENSION: ICD-10-CM

## 2024-01-19 DIAGNOSIS — Z12.11 SCREEN FOR COLON CANCER: ICD-10-CM

## 2024-01-19 DIAGNOSIS — Z00.00 ROUTINE GENERAL MEDICAL EXAMINATION AT A HEALTH CARE FACILITY: Primary | ICD-10-CM

## 2024-01-19 PROCEDURE — 99396 PREV VISIT EST AGE 40-64: CPT | Performed by: INTERNAL MEDICINE

## 2024-01-19 RX ORDER — ATORVASTATIN CALCIUM 40 MG/1
40 TABLET, FILM COATED ORAL DAILY
Qty: 90 TABLET | Refills: 3 | Status: SHIPPED | OUTPATIENT
Start: 2024-01-19

## 2024-01-19 RX ORDER — FENOFIBRATE 145 MG/1
145 TABLET, COATED ORAL DAILY
Qty: 90 TABLET | Refills: 3 | Status: SHIPPED | OUTPATIENT
Start: 2024-01-19

## 2024-01-19 RX ORDER — LISINOPRIL AND HYDROCHLOROTHIAZIDE 20; 25 MG/1; MG/1
1 TABLET ORAL DAILY
Qty: 90 TABLET | Refills: 3 | Status: SHIPPED | OUTPATIENT
Start: 2024-01-19

## 2024-01-19 ASSESSMENT — ENCOUNTER SYMPTOMS
DIARRHEA: 0
JOINT SWELLING: 0
CHILLS: 0
NAUSEA: 0
NERVOUS/ANXIOUS: 0
COUGH: 0
FREQUENCY: 0
SORE THROAT: 0
ARTHRALGIAS: 0
HEADACHES: 0
DIZZINESS: 0
FEVER: 0
ABDOMINAL PAIN: 0
HEMATURIA: 0
DYSURIA: 0
SHORTNESS OF BREATH: 0
EYE PAIN: 0
PALPITATIONS: 0
CONSTIPATION: 0
WEAKNESS: 0
MYALGIAS: 0

## 2024-01-19 ASSESSMENT — PAIN SCALES - GENERAL: PAINLEVEL: NO PAIN (0)

## 2024-01-19 NOTE — PROGRESS NOTES
"Preventive Care Visit  [facility] YURIY Olson MD, Internal Medicine  Jan 19, 2024      SUBJECTIVE:   Fany is a 48 year old, presenting for the following:  Physical        1/19/2024     7:57 AM   Additional Questions   Roomed by Liv         1/19/2024     7:57 AM   Patient Reported Additional Medications   Patient reports taking the following new medications none     Healthy Habits:     Getting at least 3 servings of Calcium per day:  NO    Bi-annual eye exam:  NO    Dental care twice a year:  Yes    Sleep apnea or symptoms of sleep apnea:  None    Diet:  Low fat/cholesterol and Breakfast skipped    Frequency of exercise:  6-7 days/week    Duration of exercise:  45-60 minutes    Taking medications regularly:  Yes    Medication side effects:  None    Additional concerns today:  No      Today's PHQ-2 Score:       1/19/2024     7:56 AM   PHQ-2 ( 1999 [employer])   Q1: Little interest or pleasure in doing things 0   Q2: Feeling down, depressed or hopeless 0   PHQ-2 Score 0   Q1: Little interest or pleasure in doing things Not at all   Q2: Feeling down, depressed or hopeless Not at all   PHQ-2 Score 0                       Social History     Tobacco Use    Smoking status: Every Day     Packs/day: 0.50     Years: 20.00     Additional pack years: 0.00     Total pack years: 10.00     Types: Cigarettes    Smokeless tobacco: Never   Substance Use Topics    Alcohol use: Not Currently     Comment: occasionally             1/15/2024     7:16 AM   Alcohol Use   Prescreen: >3 drinks/day or >7 drinks/week? No          No data to display                Last PSA: No results found for: \"PSA\"    Reviewed orders with patient. Reviewed health maintenance and updated orders accordingly - Yes  Lab work is in process    Reviewed and updated as needed this visit by clinical staff   Tobacco  Allergies  Meds              Reviewed and updated as needed this visit by Provider                    Review of Systems " "  Constitutional:  Negative for chills and fever.   HENT:  Negative for congestion, ear pain, hearing loss and sore throat.    Eyes:  Negative for pain and visual disturbance.   Respiratory:  Negative for cough and shortness of breath.    Cardiovascular:  Negative for chest pain and palpitations.   Gastrointestinal:  Negative for abdominal pain, constipation, diarrhea and nausea.   Genitourinary:  Negative for dysuria, frequency, genital sores, hematuria, penile discharge and urgency.   Musculoskeletal:  Negative for arthralgias, joint swelling and myalgias.   Skin:  Negative for rash.   Neurological:  Negative for dizziness, weakness and headaches.   Psychiatric/Behavioral:  The patient is not nervous/anxious.          OBJECTIVE:   /87 (BP Location: Left arm, Patient Position: Sitting, Cuff Size: Adult Regular)   Pulse 57   Temp 97.4  F (36.3  C) (Oral)   Resp 15   Ht 1.698 m (5' 6.85\")   Wt 71.3 kg (157 lb 3.2 oz)   SpO2 100%   BMI 24.73 kg/m     Estimated body mass index is 24.73 kg/m  as calculated from the following:    Height as of this encounter: 1.698 m (5' 6.85\").    Weight as of this encounter: 71.3 kg (157 lb 3.2 oz).  Physical Exam  GENERAL: alert and no distress  EYES: Eyes grossly normal to inspection, PERRL and conjunctivae and sclerae normal  HENT: ear canals and TM's normal, nose and mouth without ulcers or lesions  NECK: no adenopathy, no asymmetry, masses, or scars  RESP: lungs clear to auscultation - no rales, rhonchi or wheezes  CV: regular rate and rhythm, normal S1 S2, no S3 or S4, no murmur, click or rub, no peripheral edema  ABDOMEN: soft, nontender, no hepatosplenomegaly, no masses and bowel sounds normal  MS: no gross musculoskeletal defects noted, no edema  SKIN: no suspicious lesions or rashes  NEURO: Normal strength and tone, mentation intact and speech normal  PSYCH: mentation appears normal, affect normal/bright  LYMPH: no cervical, supraclavicular, axillary, or inguinal " adenopathy    Diagnostic Test Results:  Labs reviewed in Epic    ASSESSMENT/PLAN:   Routine general medical examination at a health care facility  He does a lot of physical activity  Blood pressure is under good control  Discussed about diet and exercise  Discussed about hepatitis B and pneumococcal vaccination  He is going to look into them    Screen for colon cancer    - Colonoscopy Screening  Referral; Future    Mixed hyperlipidemia  He has elevated LDL but also elevated triglycerides  - atorvastatin (LIPITOR) 40 MG tablet; Take 1 tablet (40 mg) by mouth daily    Hypertriglyceridemia  He has not been taking his Tricor and atorvastatin for some time  I discussed with him about taking them regularly but he tells me that he took some tea in the past which helped with his triglycerides  He wants to do this again next year before checking his triglycerides when he will stop Tricor for  sometime and then see if just that the tea will get the numbers down  - fenofibrate (TRICOR) 145 MG tablet; Take 1 tablet (145 mg) by mouth daily    Essential hypertension  Blood pressure is under good control  - lisinopril-hydrochlorothiazide (ZESTORETIC) 20-25 MG tablet; Take 1 tablet by mouth daily    Patient has been advised of split billing requirements and indicates understanding: No      Counseling  Reviewed preventive health counseling, as reflected in patient instructions       Regular exercise       Healthy diet/nutrition       Vision screening       Immunizations  Declined: Hepatitis B due to Other              Colorectal cancer screening        He reports that he has been smoking cigarettes. He has a 10 pack-year smoking history. He has never used smokeless tobacco.  Nicotine/Tobacco Cessation Plan  Information offered: Patient not interested at this time            Signed Electronically by: Sergio Olson MD  Answers submitted by the patient for this visit:  Annual Preventive Visit (Submitted on 1/15/2024)  Chief  Complaint: Annual Exam:  Blood in stool: No  heartburn: No  peripheral edema: No  mood changes: No  Skin sensation changes: No  impotence: Yes

## 2024-02-02 ENCOUNTER — TELEPHONE (OUTPATIENT)
Dept: GASTROENTEROLOGY | Facility: CLINIC | Age: 49
End: 2024-02-02

## 2024-02-02 ENCOUNTER — TELEPHONE (OUTPATIENT)
Dept: GASTROENTEROLOGY | Facility: CLINIC | Age: 49
End: 2024-02-02
Payer: COMMERCIAL

## 2024-02-02 NOTE — TELEPHONE ENCOUNTER
"Endoscopy Scheduling Screen    Have you had a positive Covid test in the last 14 days?  No    Are you active on MyChart?   Yes    What insurance is in the chart?  Other:  [facility]    Ordering/Referring Provider:   ANTONIO BARKER        (If ordering provider performs procedure, schedule with ordering provider unless otherwise instructed. )    BMI: Estimated body mass index is 24.73 kg/m  as calculated from the following:    Height as of 1/19/24: 1.698 m (5' 6.85\").    Weight as of 1/19/24: 71.3 kg (157 lb 3.2 oz).     Sedation Ordered  moderate sedation.   If patient BMI > 50 do not schedule in [facility].    If patient BMI > 45 do not schedule at [facility].    Are you taking methadone or Suboxone?  No    Have you had difficulties, pain, or discomfort during past endoscopy procedures?  No    Are you taking any prescription medications for pain 3 or more times per week?   NO - No RN review required.    Do you have a history of malignant hyperthermia or adverse reaction to anesthesia?  No    (Females) Are you currently pregnant?   No     Have you been diagnosed or told you have pulmonary hypertension?   No    Do you have an LVAD?  No    Have you been told you have moderate to severe sleep apnea?  No    Have you been told you have COPD, asthma, or any other lung disease?  No    Do you have any heart conditions?  No     Have you ever had an organ transplant?   No    Have you ever had or are you awaiting a heart or lung transplant?   No    Have you had a stroke or transient ischemic attack (TIA aka \"mini stroke\" in the last 6 months?   No    Have you been diagnosed with or been told you have cirrhosis of the liver?   No    Are you currently on dialysis?   No    Do you need assistance transferring?   No    BMI: Estimated body mass index is 24.73 kg/m  as calculated from the following:    Height as of 1/19/24: 1.698 m (5' 6.85\").    Weight as of 1/19/24: 71.3 kg (157 lb 3.2 oz).     Is patients BMI > 40 and scheduling location " [facility]?  No    Do you take an injectable medication for weight loss or diabetes (excluding insulin)?  No    Do you take the medication Naltrexone?  No    Do you take blood thinners?  No       Prep   Are you currently on dialysis or do you have chronic kidney disease?  No    Do you have a diagnosis of diabetes?  No    Do you have a diagnosis of cystic fibrosis (CF)?  No    On a regular basis do you go 3 -5 days between bowel movements?  No    BMI > 40?  No    Preferred Pharmacy:    [id number] - [place], [place] - [address]  [address] [address]  Phone: [phone] Fax: [phone]      Final Scheduling Details   Colonoscopy prep sent?  N/A    Procedure scheduled  Colonoscopy    Surgeon:  Rosita     Date of procedure:  2/13     Pre-OP / PAC:   No - Not required for this site.    Location  [facility] - [facility] - Patient preference.    Sedation   Moderate Sedation - Per order.      Patient Reminders:   You will receive a call from a Nurse to review instructions and health history.  This assessment must be completed prior to your procedure.  Failure to complete the Nurse assessment may result in the procedure being cancelled.      On the day of your procedure, please designate an adult(s) who can drive you home stay with you for the next 24 hours. The medicines used in the exam will make you sleepy. You will not be able to drive.      You cannot take public transportation, ride share services, or non-medical taxi service without a responsible caregiver.  Medical transport services are allowed with the requirement that a responsible caregiver will receive you at your destination.  We require that drivers and caregivers are confirmed prior to your procedure.

## 2024-02-02 NOTE — TELEPHONE ENCOUNTER
Pre assessment completed for upcoming procedure.      Procedure details:    Patient scheduled for Colonoscopy  on 02.13.2024.     Arrival time: 1215. Procedure time 1300    Pre op exam needed? N/A    Facility location: [facility]; [address], [place] [address]    Sedation type: Conscious sedation     Indication for procedure: Screen for colon cancer     COVID policy reviewed.    Designated  policy reviewed. Instructed to have someone stay 6 hours post procedure.       Chart review:     Electronic implanted devices? No    Recent diagnosis of diverticulitis within the last 6 weeks?  No    Diabetic? No    Diabetic medication HOLDING recommendations: (if applicable)  Oral diabetic medications: N/A  Diabetic injectables: N/A  Insulin: N/A    Medication review:    Anticoagulants? No    NSAIDS? No    Other medication HOLDING recommendations:  N/A      Prep for procedure:     Bowel prep recommendation: Standard Miralax  Due to: standard bowel prep.    Prep instructions sent via SureDone     Reviewed procedure prep instructions.     Patient verbalized understanding and had no questions or concerns at this time.        Flora Bravo RN  Endoscopy Procedure Pre Assessment RN  [phone] option 4

## 2024-02-13 ENCOUNTER — HOSPITAL ENCOUNTER (OUTPATIENT)
Facility: AMBULATORY SURGERY CENTER | Age: 49
Discharge: HOME | End: 2024-02-13
Attending: SURGERY | Admitting: SURGERY
Payer: COMMERCIAL

## 2024-02-13 VITALS
RESPIRATION RATE: 16 BRPM | TEMPERATURE: 97 F | HEART RATE: 51 BPM | SYSTOLIC BLOOD PRESSURE: 104 MMHG | DIASTOLIC BLOOD PRESSURE: 75 MMHG | OXYGEN SATURATION: 98 %

## 2024-02-13 LAB — COLONOSCOPY: NORMAL

## 2024-02-13 PROCEDURE — G8918 PT W/O PREOP ORDER IV AB PRO: HCPCS

## 2024-02-13 PROCEDURE — 45378 DIAGNOSTIC COLONOSCOPY: CPT

## 2024-02-13 PROCEDURE — G8907 PT DOC NO EVENTS ON DISCHARG: HCPCS

## 2024-02-13 RX ORDER — FLUMAZENIL 0.1 MG/ML
0.2 INJECTION, SOLUTION INTRAVENOUS
Status: DISCONTINUED | OUTPATIENT
Start: 2024-02-13 | End: 2024-02-14 | Stop reason: HOSPADM

## 2024-02-13 RX ORDER — ONDANSETRON 2 MG/ML
4 INJECTION INTRAMUSCULAR; INTRAVENOUS
Status: DISCONTINUED | OUTPATIENT
Start: 2024-02-13 | End: 2024-02-14 | Stop reason: HOSPADM

## 2024-02-13 RX ORDER — NALOXONE HYDROCHLORIDE 0.4 MG/ML
0.2 INJECTION, SOLUTION INTRAMUSCULAR; INTRAVENOUS; SUBCUTANEOUS
Status: DISCONTINUED | OUTPATIENT
Start: 2024-02-13 | End: 2024-02-14 | Stop reason: HOSPADM

## 2024-02-13 RX ORDER — PROCHLORPERAZINE MALEATE 10 MG
10 TABLET ORAL EVERY 6 HOURS PRN
Status: DISCONTINUED | OUTPATIENT
Start: 2024-02-13 | End: 2024-02-14 | Stop reason: HOSPADM

## 2024-02-13 RX ORDER — ONDANSETRON 2 MG/ML
4 INJECTION INTRAMUSCULAR; INTRAVENOUS EVERY 6 HOURS PRN
Status: DISCONTINUED | OUTPATIENT
Start: 2024-02-13 | End: 2024-02-14 | Stop reason: HOSPADM

## 2024-02-13 RX ORDER — FENTANYL CITRATE 50 UG/ML
INJECTION, SOLUTION INTRAMUSCULAR; INTRAVENOUS PRN
Status: DISCONTINUED | OUTPATIENT
Start: 2024-02-13 | End: 2024-02-13 | Stop reason: HOSPADM

## 2024-02-13 RX ORDER — NALOXONE HYDROCHLORIDE 0.4 MG/ML
0.4 INJECTION, SOLUTION INTRAMUSCULAR; INTRAVENOUS; SUBCUTANEOUS
Status: DISCONTINUED | OUTPATIENT
Start: 2024-02-13 | End: 2024-02-14 | Stop reason: HOSPADM

## 2024-02-13 RX ORDER — ONDANSETRON 4 MG/1
4 TABLET, ORALLY DISINTEGRATING ORAL EVERY 6 HOURS PRN
Status: DISCONTINUED | OUTPATIENT
Start: 2024-02-13 | End: 2024-02-14 | Stop reason: HOSPADM

## 2024-02-13 RX ORDER — LIDOCAINE 40 MG/G
CREAM TOPICAL
Status: DISCONTINUED | OUTPATIENT
Start: 2024-02-13 | End: 2024-02-14 | Stop reason: HOSPADM

## 2024-02-13 NOTE — H&P
Patient seen for Endoscopy    HPI:  Patient is a 48 year old male here for endoscopy. Not taking blood thinning medications. No MI or CVA history. No issues with previous sedation. No recent acute illness.    Review Of Systems    Skin: negative  Ears/Nose/Throat: negative  Respiratory: No shortness of breath, dyspnea on exertion, cough, or hemoptysis  Cardiovascular: negative  Gastrointestinal: negative  Genitourinary: negative  Musculoskeletal: negative  Neurologic: negative  Hematologic/Lymphatic/Immunologic: negative  Endocrine: negative      Past Medical History:   Diagnosis Date    Hypertension        Past Surgical History:   Procedure Laterality Date    ZZC REPAIR CRUCIATE LIGAMENT,KNEE      Description: Primary Repair Of Knee Ligament Cruciate Anterior;  Recorded: 01/28/2010;       Family History   Problem Relation Age of Onset    Heart Surgery Father     Heart Disease Father     Hyperlipidemia Father     Hypertension Father     Coronary Artery Disease Father         Tripple bypass    Cerebrovascular Disease Mother     Hypertension Mother     Diabetes Mother         Desease       Social History     Socioeconomic History    Marital status: Single     Spouse name: Not on file    Number of children: Not on file    Years of education: Not on file    Highest education level: Not on file   Occupational History    Not on file   Tobacco Use    Smoking status: Every Day     Packs/day: 0.50     Years: 20.00     Additional pack years: 0.00     Total pack years: 10.00     Types: Cigarettes    Smokeless tobacco: Never   Substance and Sexual Activity    Alcohol use: Not Currently     Comment: occasionally    Drug use: Never    Sexual activity: Yes     Partners: Female     Birth control/protection: None   Other Topics Concern    Parent/sibling w/ CABG, MI or angioplasty before 65F 55M? Yes     Comment: Father   Social History Narrative    Not on file     Social Determinants of Health     Financial Resource Strain: Low Risk   (12/28/2023)    Financial Resource Strain     Within the past 12 months, have you or your family members you live with been unable to get utilities (heat, electricity) when it was really needed?: No   Food Insecurity: Low Risk  (12/28/2023)    Food Insecurity     Within the past 12 months, did you worry that your food would run out before you got money to buy more?: No     Within the past 12 months, did the food you bought just not last and you didn t have money to get more?: No   Transportation Needs: Low Risk  (12/28/2023)    Transportation Needs     Within the past 12 months, has lack of transportation kept you from medical appointments, getting your medicines, non-medical meetings or appointments, work, or from getting things that you need?: No   Physical Activity: Not on file   Stress: Not on file   Social Connections: Not on file   Interpersonal Safety: Low Risk  (1/19/2024)    Interpersonal Safety     Do you feel physically and emotionally safe where you currently live?: Yes     Within the past 12 months, have you been hit, slapped, kicked or otherwise physically hurt by someone?: No     Within the past 12 months, have you been humiliated or emotionally abused in other ways by your partner or ex-partner?: No   Housing Stability: Low Risk  (12/28/2023)    Housing Stability     Do you have housing? : Yes     Are you worried about losing your housing?: No       Current Outpatient Medications   Medication Sig Dispense Refill    atorvastatin (LIPITOR) 40 MG tablet Take 1 tablet (40 mg) by mouth daily 90 tablet 3    fenofibrate (TRICOR) 145 MG tablet Take 1 tablet (145 mg) by mouth daily 90 tablet 3    lisinopril-hydrochlorothiazide (ZESTORETIC) 20-25 MG tablet Take 1 tablet by mouth daily 90 tablet 3       Medications and history reviewed    Physical exam:  Vitals: /69 (Cuff Size: Adult Regular)   Pulse 58   Temp (!) 96.7  F (35.9  C) (Temporal)   Resp 16   SpO2 98%   BMI= There is no height or weight on  file to calculate BMI.    Constitutional: Healthy, alert, non-distressed   Head: Normo-cephalic, atraumatic, no lesions, masses or tenderness   Cardiovascular: RRR, no new murmurs, +S1, +S2 heart sounds, no clicks, rubs or gallops   Respiratory: CTAB, no rales, rhonchi or wheezing, equal chest rise, good respiratory effort   Gastrointestinal: Soft, non-tender, non distended, no rebound rigidity or guarding, no masses or hernias palpated   : Deferred  Musculoskeletal: Moves all extremities, normal  strength, no deformities noted   Skin: No suspicious lesions or rashes   Psychiatric: Mentation appears normal, affect appropriate   Hematologic/Lymphatic/Immunologic: Normal cervical and supraclavicular lymph nodes   Patient able to get up on table without difficulty.    Labs show:  No results found for this or any previous visit (from the past 24 hour(s)).    Assessment: Endoscopy  Plan: Pt cleared for anesthesia for proposed procedure.    Adrian Becker, DO

## 2025-01-27 DIAGNOSIS — E78.2 MIXED HYPERLIPIDEMIA: ICD-10-CM

## 2025-01-27 DIAGNOSIS — I10 ESSENTIAL HYPERTENSION: ICD-10-CM

## 2025-01-28 RX ORDER — LISINOPRIL AND HYDROCHLOROTHIAZIDE 20; 25 MG/1; MG/1
1 TABLET ORAL DAILY
Qty: 90 TABLET | Refills: 0 | Status: SHIPPED | OUTPATIENT
Start: 2025-01-28

## 2025-01-28 RX ORDER — ATORVASTATIN CALCIUM 40 MG/1
40 TABLET, FILM COATED ORAL DAILY
Qty: 90 TABLET | Refills: 0 | Status: SHIPPED | OUTPATIENT
Start: 2025-01-28

## 2025-02-23 ENCOUNTER — HEALTH MAINTENANCE LETTER (OUTPATIENT)
Age: 50
End: 2025-02-23

## 2025-05-11 ENCOUNTER — RESULTS FOLLOW-UP (OUTPATIENT)
Dept: FAMILY MEDICINE | Facility: CLINIC | Age: 50
End: 2025-05-11
Payer: COMMERCIAL

## 2025-07-14 ENCOUNTER — ALLIED HEALTH/NURSE VISIT (OUTPATIENT)
Dept: FAMILY MEDICINE | Facility: CLINIC | Age: 50
End: 2025-07-14
Payer: COMMERCIAL

## 2025-07-14 DIAGNOSIS — Z23 NEED FOR VACCINATION: Primary | ICD-10-CM

## 2025-07-14 PROCEDURE — 90471 IMMUNIZATION ADMIN: CPT

## 2025-07-14 PROCEDURE — 90750 HZV VACC RECOMBINANT IM: CPT

## 2025-07-14 NOTE — PROGRESS NOTES
Prior to immunization administration, verified patients identity using patient s name and date of birth. Please see Immunization Activity for additional information.     Screening Questionnaire for Adult Immunization    Are you sick today?   No   Do you have allergies to medications, food, a vaccine component or latex?   No   Have you ever had a serious reaction after receiving a vaccination?   No   Do you have a long-term health problem with heart, lung, kidney, or metabolic disease (e.g., diabetes), asthma, a blood disorder, no spleen, complement component deficiency, a cochlear implant, or a spinal fluid leak?  Are you on long-term aspirin therapy?   No   Do you have cancer, leukemia, HIV/AIDS, or any other immune system problem?   No   Do you have a parent, brother, or sister with an immune system problem?   No   In the past 3 months, have you taken medications that affect  your immune system, such as prednisone, other steroids, or anticancer drugs; drugs for the treatment of rheumatoid arthritis, Crohn s disease, or psoriasis; or have you had radiation treatments?   No   Have you had a seizure, or a brain or other nervous system problem?   No   During the past year, have you received a transfusion of blood or blood    products, or been given immune (gamma) globulin or antiviral drug?   No   For women: Are you pregnant or is there a chance you could become       pregnant during the next month?   No   Have you received any vaccinations in the past 4 weeks?   No     Immunization questionnaire answers were all negative.      Patient instructed to remain in clinic for 15 minutes afterwards, and to report any adverse reactions.     Screening performed by Jannet Spence MA.

## 2025-07-19 ENCOUNTER — HOSPITAL ENCOUNTER (EMERGENCY)
Facility: HOSPITAL | Age: 50
Discharge: HOME | End: 2025-07-19
Attending: EMERGENCY MEDICINE
Payer: COMMERCIAL

## 2025-07-19 ENCOUNTER — APPOINTMENT (OUTPATIENT)
Dept: RADIOLOGY | Facility: HOSPITAL | Age: 50
End: 2025-07-19
Attending: EMERGENCY MEDICINE
Payer: COMMERCIAL

## 2025-07-19 VITALS
WEIGHT: 163.2 LBS | HEIGHT: 67 IN | HEART RATE: 67 BPM | SYSTOLIC BLOOD PRESSURE: 146 MMHG | BODY MASS INDEX: 25.62 KG/M2 | TEMPERATURE: 98.4 F | OXYGEN SATURATION: 100 % | DIASTOLIC BLOOD PRESSURE: 88 MMHG

## 2025-07-19 DIAGNOSIS — R06.00 DYSPNEA, UNSPECIFIED TYPE: ICD-10-CM

## 2025-07-19 DIAGNOSIS — R07.9 CHEST PAIN, UNSPECIFIED TYPE: ICD-10-CM

## 2025-07-19 LAB
ANION GAP SERPL CALCULATED.3IONS-SCNC: 7 MMOL/L (ref 7–15)
BASOPHILS # BLD AUTO: 0 10E3/UL (ref 0–0.2)
BASOPHILS NFR BLD AUTO: 1 %
BUN SERPL-MCNC: 16.4 MG/DL (ref 6–20)
CALCIUM SERPL-MCNC: 9.1 MG/DL (ref 8.8–10.4)
CHLORIDE SERPL-SCNC: 106 MMOL/L (ref 98–107)
CREAT SERPL-MCNC: 1.26 MG/DL (ref 0.67–1.17)
EGFRCR SERPLBLD CKD-EPI 2021: 69 ML/MIN/1.73M2
EOSINOPHIL # BLD AUTO: 0.1 10E3/UL (ref 0–0.7)
EOSINOPHIL NFR BLD AUTO: 3 %
ERYTHROCYTE [DISTWIDTH] IN BLOOD BY AUTOMATED COUNT: 13.1 % (ref 10–15)
GLUCOSE SERPL-MCNC: 100 MG/DL (ref 70–99)
HCO3 SERPL-SCNC: 27 MMOL/L (ref 22–29)
HCT VFR BLD AUTO: 35.9 % (ref 40–53)
HGB BLD-MCNC: 12 G/DL (ref 13.3–17.7)
IMM GRANULOCYTES # BLD: 0 10E3/UL
IMM GRANULOCYTES NFR BLD: 0 %
LYMPHOCYTES # BLD AUTO: 1.6 10E3/UL (ref 0.8–5.3)
LYMPHOCYTES NFR BLD AUTO: 43 %
MCH RBC QN AUTO: 31 PG (ref 26.5–33)
MCHC RBC AUTO-ENTMCNC: 33.4 G/DL (ref 31.5–36.5)
MCV RBC AUTO: 93 FL (ref 78–100)
MONOCYTES # BLD AUTO: 0.3 10E3/UL (ref 0–1.3)
MONOCYTES NFR BLD AUTO: 8 %
NEUTROPHILS # BLD AUTO: 1.7 10E3/UL (ref 1.6–8.3)
NEUTROPHILS NFR BLD AUTO: 45 %
NRBC # BLD AUTO: 0 10E3/UL
NRBC BLD AUTO-RTO: 0 /100
PLATELET # BLD AUTO: 167 10E3/UL (ref 150–450)
POTASSIUM SERPL-SCNC: 3.7 MMOL/L (ref 3.4–5.3)
RBC # BLD AUTO: 3.87 10E6/UL (ref 4.4–5.9)
SODIUM SERPL-SCNC: 140 MMOL/L (ref 135–145)
TROPONIN T SERPL HS-MCNC: 43 NG/L
TROPONIN T SERPL HS-MCNC: 58 NG/L
WBC # BLD AUTO: 3.7 10E3/UL (ref 4–11)

## 2025-07-19 PROCEDURE — 93005 ELECTROCARDIOGRAM TRACING: CPT | Performed by: EMERGENCY MEDICINE

## 2025-07-19 PROCEDURE — 71045 X-RAY EXAM CHEST 1 VIEW: CPT

## 2025-07-19 PROCEDURE — 84484 ASSAY OF TROPONIN QUANT: CPT | Performed by: EMERGENCY MEDICINE

## 2025-07-19 PROCEDURE — 80048 BASIC METABOLIC PNL TOTAL CA: CPT | Performed by: EMERGENCY MEDICINE

## 2025-07-19 PROCEDURE — 85018 HEMOGLOBIN: CPT | Performed by: EMERGENCY MEDICINE

## 2025-07-19 PROCEDURE — 36415 COLL VENOUS BLD VENIPUNCTURE: CPT | Performed by: EMERGENCY MEDICINE

## 2025-07-19 PROCEDURE — 99285 EMERGENCY DEPT VISIT HI MDM: CPT | Mod: 25 | Performed by: EMERGENCY MEDICINE

## 2025-07-19 RX ORDER — ALBUTEROL SULFATE 90 UG/1
2 INHALANT RESPIRATORY (INHALATION) EVERY 6 HOURS PRN
Qty: 18 G | Refills: 0 | Status: SHIPPED | OUTPATIENT
Start: 2025-07-19

## 2025-07-19 ASSESSMENT — COLUMBIA-SUICIDE SEVERITY RATING SCALE - C-SSRS
6. HAVE YOU EVER DONE ANYTHING, STARTED TO DO ANYTHING, OR PREPARED TO DO ANYTHING TO END YOUR LIFE?: NO
2. HAVE YOU ACTUALLY HAD ANY THOUGHTS OF KILLING YOURSELF IN THE PAST MONTH?: NO
1. IN THE PAST MONTH, HAVE YOU WISHED YOU WERE DEAD OR WISHED YOU COULD GO TO SLEEP AND NOT WAKE UP?: NO

## 2025-07-19 ASSESSMENT — ACTIVITIES OF DAILY LIVING (ADL)
ADLS_ACUITY_SCORE: 41

## 2025-07-19 NOTE — ED PROVIDER NOTES
EMERGENCY DEPARTMENT ENCOUNTER      NAME: Fany Sage  AGE: 50 year old male  YOB: 1975  MRN: [id number]  EVALUATION DATE & TIME: No admission date for patient encounter.    PCP: Sergio Olson    ED PROVIDER: Silvio Robledo D.O.      Chief Complaint   Patient presents with    Chest Pain    Shortness of Breath    Palpitations       FINAL IMPRESSION:  1. Chest pain, unspecified type    2. Dyspnea, unspecified type        ED COURSE & MEDICAL DECISION MAKIN:17 PM I met with the patient to gather history and to perform my initial exam. I discussed the plan for care while in the Emergency Department.  7:58 PM I updated the patient and discussed discharge. He is comfortable with the going home.         Pertinent Labs & Imaging studies reviewed. (See chart for details)  50 year old male presents to the Emergency Department for evaluation of intermittent episodes of chest pain and shortness of breath.  Patient reports episodes last a matter of seconds, does not appear to be definitively provoked by any activity.  Initial differential does include ACS, PE, dissection, pneumothorax, musculoskeletal etiology, other acute process.  He is not tachycardic or hypoxic, otherwise PERC negative, doubt PE.  EKG does not show any evidence of ischemia or arrhythmia, and 2 troponins are both negative, second  was actually downtrending.  At this time doubt ACS.  Chest x-ray was performed without evidence of mediastinal widening that would suggest dissection nor was or evidence of pneumothorax or pneumonia.  At this time, uncertain the underlying cause, however he does have some significant risk factors, therefore I do believe the patient would benefit from follow-up with cardiology as an outpatient.  He was comfortable with this plan.  Return precautions were discussed.    Medical Decision Making  Care impacted by Smoking / Nicotine Use  Discharge. No recommendations on prescription strength medication(s). See  "documentation for any additional details.    MIPS (CTPE, Dental pain, Luo, Sinusitis, Asthma/COPD, Head Trauma): Not Applicable    SEPSIS: None          At the conclusion of the encounter I discussed the results of all of the tests and the disposition. The questions were answered. The patient or family acknowledged understanding and was agreeable with the care plan.        HPI    Patient information was obtained from: patient    Use of : N/A        Fany Sage is a 50 year old male who presents for palpitations, chest pain, and shortness of breath.    Patient has been experiencing intermittent left sided chest pain for the past 3 weeks. He notes that these episodes are spontaneous and typically last 10-15 seconds. He endorses shortness of breath and notes that exercise provokes this symptom. He reports that he has \"always had abnormal heart rhythms\". He reports a personal medical history of HTN and notes the use of cholesterol medications and smoking tobacco.    Patient denies fever, nausea, cough, congestion, vomiting, or alcohol use.    Per Chart Review: patient was [facility] at [facility] on 05/09/2025 for a physical. BMP, Lipid panel reflex, and hemoglobin A1C were run. Urea nitrogen was 24.8, and his cholesterol was high. Visit diagnoses include HTN, hyperlipidemia, and erectile dysfunction. Medication changes include: starting 50mg Viagra PRN.      PAST MEDICAL HISTORY:  Past Medical History:   Diagnosis Date    Hypertension        PAST SURGICAL HISTORY:  Past Surgical History:   Procedure Laterality Date    COLONOSCOPY WITH CO2 INSUFFLATION N/A 2/13/2024    Procedure: Colonoscopy with CO2 insufflation;  Surgeon: Adrian Becker DO;  Location:  OR    [facility] REPAIR CRUCIATE LIGAMENT,KNEE      Description: Primary Repair Of Knee Ligament Cruciate Anterior;  Recorded: 01/28/2010;         CURRENT MEDICATIONS:    No current facility-administered medications for this encounter. "     Current Outpatient Medications   Medication Sig Dispense Refill    albuterol (PROAIR HFA/PROVENTIL HFA/VENTOLIN HFA) 108 (90 Base) MCG/ACT inhaler Inhale 2 puffs into the lungs every 6 hours as needed for shortness of breath, wheezing or cough. 18 g 0    atorvastatin (LIPITOR) 40 MG tablet Take 1 tablet (40 mg) by mouth daily. 90 tablet 3    fenofibrate (TRICOR) 145 MG tablet Take 1 tablet (145 mg) by mouth daily. 90 tablet 3    lisinopril-hydrochlorothiazide (ZESTORETIC) 20-25 MG tablet Take 1 tablet by mouth daily. 90 tablet 3    sildenafil (VIAGRA) 50 MG tablet Take 1 tablet (50 mg) by mouth daily as needed (Erectile dysfuction). 50 mg once daily as needed 1 hour before sexual activity; may be taken up to 4 hours before sexual activity. Reduce to 25 mg once daily if side effects occur. May increase to a maximum dose of 100 mg once daily if there is incomplete response. 30 tablet 3         ALLERGIES:  No Known Allergies    FAMILY HISTORY:  Family History   Problem Relation Age of Onset    Heart Surgery Father     Heart Disease Father     Hyperlipidemia Father     Hypertension Father     Coronary Artery Disease Father         Tripple bypass    Cerebrovascular Disease Mother     Hypertension Mother     Diabetes Mother         Desease       SOCIAL HISTORY:  Social History     Socioeconomic History    Marital status: Single   Tobacco Use    Smoking status: Every Day     Current packs/day: 0.50     Average packs/day: 0.5 packs/day for 20.0 years (10.0 ttl pk-yrs)     Types: Cigarettes    Smokeless tobacco: Never   Substance and Sexual Activity    Alcohol use: Not Currently     Comment: occasionally    Drug use: Never    Sexual activity: Yes     Partners: Female     Birth control/protection: None   Other Topics Concern    Parent/sibling w/ CABG, MI or angioplasty before 65F 55M? Yes     Comment: Father     Social Drivers of Health     Financial Resource Strain: Low Risk  (5/5/2025)    Financial Resource Strain      Within the past 12 months, have you or your family members you live with been unable to get utilities (heat, electricity) when it was really needed?: No   Food Insecurity: Low Risk  (5/5/2025)    Food Insecurity     Within the past 12 months, did you worry that your food would run out before you got money to buy more?: No     Within the past 12 months, did the food you bought just not last and you didn t have money to get more?: No   Transportation Needs: Low Risk  (5/5/2025)    Transportation Needs     Within the past 12 months, has lack of transportation kept you from medical appointments, getting your medicines, non-medical meetings or appointments, work, or from getting things that you need?: No   Physical Activity: Sufficiently Active (5/5/2025)    Exercise Vital Sign     Days of Exercise per Week: 5 days     Minutes of Exercise per Session: 60 min   Stress: No Stress Concern Present (5/5/2025)    [employer] - Occupational Stress Questionnaire     Feeling of Stress : Not at all   Social Connections: Unknown (5/5/2025)    Social Connection and Isolation Panel [NHANES]     Frequency of Social Gatherings with Friends and Family: Once a week   Interpersonal Safety: Low Risk  (5/9/2025)    Interpersonal Safety     Do you feel physically and emotionally safe where you currently live?: Yes     Within the past 12 months, have you been hit, slapped, kicked or otherwise physically hurt by someone?: No     Within the past 12 months, have you been humiliated or emotionally abused in other ways by your partner or ex-partner?: No   Housing Stability: Low Risk  (5/5/2025)    Housing Stability     Do you have housing? : Yes     Are you worried about losing your housing?: No       VITALS:  Patient Vitals for the past 24 hrs:   BP Temp Temp src Pulse Resp SpO2 Height Weight   07/19/25 1929 (!) 152/90 -- -- 65 (!) 6 100 % -- --   07/19/25 1859 (!) 145/88 -- -- 68 18 100 % -- --   07/19/25 1830 136/89  "-- -- 65 14 100 % -- --   07/19/25 1800 (!) 152/93 -- -- 68 16 100 % -- --   07/19/25 1704 (!) 149/93 98.4  F (36.9  C) Oral 69 17 100 % 1.702 m (5' 7\") 74 kg (163 lb 3.2 oz)       PHYSICAL EXAM    VITAL SIGNS: BP (!) 152/90   Pulse 65   Temp 98.4  F (36.9  C) (Oral)   Resp (!) 6   Ht 1.702 m (5' 7\")   Wt 74 kg (163 lb 3.2 oz)   SpO2 100%   BMI 25.56 kg/m      General Appearance: Well-appearing, well-nourished, no acute distress   Head:  Normocephalic, without obvious abnormality, atraumatic  Eyes:  PERRL, conjunctiva/corneas clear, EOM's intact,  ENT:  Lips, mucosa, and tongue normal, membranes are moist without pallor  Neck:  Normal ROM, symmetrical, trachea midline    Cardio:  Regular rate and rhythm, no murmur, rub or gallop, 2+ pulses symmetric in all extremities  Pulm:  Clear to auscultation bilaterally, respirations unlabored,  Musculoskeletal: Full ROM, no edema, no cyanosis, good ROM of major joints  Integument:  Warm, Dry, No erythema, No rash.    Neurologic:  Alert & oriented.  No focal deficits appreciated.    Psychiatric:  Affect normal, Judgment normal, Mood normal.      LABS  Recent Results (from the past 24 hours)   CBC with platelets + differential    Narrative    The following orders were created for panel order CBC with platelets + differential.  Procedure                               Abnormality         Status                     ---------                               -----------         ------                     CBC with platelets and ...[[id number]]  Abnormal            Final result                 Please view results for these tests on the individual orders.   Basic metabolic panel   Result Value Ref Range    Sodium 140 135 - 145 mmol/L    Potassium 3.7 3.4 - 5.3 mmol/L    Chloride 106 98 - 107 mmol/L    Carbon Dioxide (CO2) 27 22 - 29 mmol/L    Anion Gap 7 7 - 15 mmol/L    Urea Nitrogen 16.4 6.0 - 20.0 mg/dL    Creatinine 1.26 (H) 0.67 - 1.17 mg/dL    GFR Estimate 69 >60 " mL/min/1.73m2    Calcium 9.1 8.8 - 10.4 mg/dL    Glucose 100 (H) 70 - 99 mg/dL   Troponin T, High Sensitivity   Result Value Ref Range    Troponin T, High Sensitivity 58 (H) <=22 ng/L   CBC with platelets and differential   Result Value Ref Range    WBC Count 3.7 (L) 4.0 - 11.0 10e3/uL    RBC Count 3.87 (L) 4.40 - 5.90 10e6/uL    Hemoglobin 12.0 (L) 13.3 - 17.7 g/dL    Hematocrit 35.9 (L) 40.0 - 53.0 %    MCV 93 78 - 100 fL    MCH 31.0 26.5 - 33.0 pg    MCHC 33.4 31.5 - 36.5 g/dL    RDW 13.1 10.0 - 15.0 %    Platelet Count 167 150 - 450 10e3/uL    % Neutrophils 45 %    % Lymphocytes 43 %    % Monocytes 8 %    % Eosinophils 3 %    % Basophils 1 %    % Immature Granulocytes 0 %    NRBCs per 100 WBC 0 <1 /100    Absolute Neutrophils 1.7 1.6 - 8.3 10e3/uL    Absolute Lymphocytes 1.6 0.8 - 5.3 10e3/uL    Absolute Monocytes 0.3 0.0 - 1.3 10e3/uL    Absolute Eosinophils 0.1 0.0 - 0.7 10e3/uL    Absolute Basophils 0.0 0.0 - 0.2 10e3/uL    Absolute Immature Granulocytes 0.0 <=0.4 10e3/uL    Absolute NRBCs 0.0 10e3/uL   XR Chest Port 1 View    Narrative    EXAM: XR CHEST PORT 1 VIEW  LOCATION: [facility]  DATE: 7/19/2025    INDICATION: Chest pain  COMPARISON: None.      Impression    IMPRESSION:  EKG wires and leads projecting over the chest obscure some details Heart size and pulmonary vascularity within normal limits. No focal lung infiltrates. Osseous structures grossly intact. Visualized upper abdomen unremarkable.   Troponin T, High Sensitivity   Result Value Ref Range    Troponin T, High Sensitivity 43 (H) <=22 ng/L         RADIOLOGY  XR Chest Port 1 View   Final Result   IMPRESSION:  EKG wires and leads projecting over the chest obscure some details Heart size and pulmonary vascularity within normal limits. No focal lung infiltrates. Osseous structures grossly intact. Visualized upper abdomen unremarkable.             EKG:    Rate: 69 bpm  Rhythm: Normal Sinus Rhythm  Axis: Normal  Interval:  Normal  Conduction: Normal  QRS: Normal  ST: Normal  T-wave: Normal  QT: Not prolonged  Comparison EKG: no previous available for comparison  Impression:  No acute ischemic change   I have independently reviewed and interpreted today's EKG, pending Cardiologist read          MEDICATIONS GIVEN IN THE EMERGENCY:  Medications - No data to display    NEW PRESCRIPTIONS STARTED AT TODAY'S ER VISIT  New Prescriptions    ALBUTEROL (PROAIR HFA/PROVENTIL HFA/VENTOLIN HFA) 108 (90 BASE) MCG/ACT INHALER    Inhale 2 puffs into the lungs every 6 hours as needed for shortness of breath, wheezing or cough.        I, Jenn Watson, am serving as a scribe to document services personally performed by Silvio Robledo D.O., based on my observations and the provider's statements to me.  I, Silvio Robledo D.O., attest that Jnen Watson is acting in a scribe capacity, has observed my performance of the services and has documented them in accordance with my direction.     Silvio Robledo D.O.  Emergency Medicine  [facility] EMERGENCY DEPARTMENT  [address] [place] [id number]  [phone]  Dept: [phone]       Silvio Robledo,   07/19/25 2052

## 2025-07-19 NOTE — ED TRIAGE NOTES
Presents to ER for eval of CP and SOB that has been ongoing for the last 2 weeks. States he feels palpitations and these episodes last ~15-20 seconds. Rest helps alleviate sx at times. Sx come on at rest though feels it when he is working out also. States pain radiates to back and also at times feels it start in his back.

## 2025-07-21 LAB
ATRIAL RATE - MUSE: 69 BPM
DIASTOLIC BLOOD PRESSURE - MUSE: NORMAL MMHG
INTERPRETATION ECG - MUSE: NORMAL
P AXIS - MUSE: 57 DEGREES
PR INTERVAL - MUSE: 260 MS
QRS DURATION - MUSE: 104 MS
QT - MUSE: 414 MS
QTC - MUSE: 443 MS
R AXIS - MUSE: 50 DEGREES
SYSTOLIC BLOOD PRESSURE - MUSE: NORMAL MMHG
T AXIS - MUSE: 54 DEGREES
VENTRICULAR RATE- MUSE: 69 BPM

## 2025-08-08 ENCOUNTER — ORDERS ONLY (AUTO-RELEASED) (OUTPATIENT)
Dept: CARDIOLOGY | Facility: CLINIC | Age: 50
End: 2025-08-08

## 2025-08-08 DIAGNOSIS — R00.2 PALPITATIONS: ICD-10-CM

## 2025-08-29 ENCOUNTER — HOSPITAL ENCOUNTER (OUTPATIENT)
Dept: CARDIOLOGY | Facility: HOSPITAL | Age: 50
Discharge: HOME | End: 2025-08-29
Attending: INTERNAL MEDICINE | Admitting: INTERNAL MEDICINE
Payer: COMMERCIAL

## 2025-08-29 DIAGNOSIS — R07.89 ATYPICAL CHEST PAIN: ICD-10-CM

## 2025-08-29 DIAGNOSIS — R00.2 PALPITATIONS: ICD-10-CM

## 2025-08-29 LAB
CV STRESS CURRENT BP HE: NORMAL
CV STRESS CURRENT HR HE: 100
CV STRESS CURRENT HR HE: 106
CV STRESS CURRENT HR HE: 106
CV STRESS CURRENT HR HE: 130
CV STRESS CURRENT HR HE: 142
CV STRESS CURRENT HR HE: 144
CV STRESS CURRENT HR HE: 147
CV STRESS CURRENT HR HE: 151
CV STRESS CURRENT HR HE: 156
CV STRESS CURRENT HR HE: 63
CV STRESS CURRENT HR HE: 68
CV STRESS CURRENT HR HE: 78
CV STRESS CURRENT HR HE: 79
CV STRESS CURRENT HR HE: 80
CV STRESS CURRENT HR HE: 85
CV STRESS CURRENT HR HE: 87
CV STRESS CURRENT HR HE: 87
CV STRESS CURRENT HR HE: 89
CV STRESS CURRENT HR HE: 90
CV STRESS CURRENT HR HE: 91
CV STRESS CURRENT HR HE: 94
CV STRESS CURRENT HR HE: 98
CV STRESS CURRENT HR HE: 99
CV STRESS DEVIATION TIME HE: NORMAL
CV STRESS ECHO PERCENT HR HE: NORMAL
CV STRESS EXERCISE STAGE HE: NORMAL
CV STRESS EXERCISE STAGE REACHED HE: NORMAL
CV STRESS FINAL RESTING BP HE: NORMAL
CV STRESS FINAL RESTING HR HE: 85
CV STRESS MAX HR HE: 157
CV STRESS MAX TREADMILL GRADE HE: 16
CV STRESS MAX TREADMILL SPEED HE: 4.2
CV STRESS PEAK DIA BP HE: NORMAL
CV STRESS PEAK SYS BP HE: NORMAL
CV STRESS PHASE HE: NORMAL
CV STRESS PROTOCOL HE: NORMAL
CV STRESS REASON STOPPED HE: NORMAL
CV STRESS RESTING PT POSITION HE: NORMAL
CV STRESS RESTING PT POSITION HE: NORMAL
CV STRESS ST DEVIATION AMOUNT HE: NORMAL
CV STRESS ST DEVIATION ELEVATION HE: NORMAL
CV STRESS ST EVELATION AMOUNT HE: NORMAL
CV STRESS SYMPTOMS HE: NORMAL
CV STRESS TEST TYPE HE: NORMAL
CV STRESS TOTAL STAGE TIME MIN 1 HE: NORMAL
STRESS ECHO BASELINE DIASTOLIC HE: 85
STRESS ECHO BASELINE HR: 67
STRESS ECHO BASELINE SYSTOLIC BP: 134
STRESS ECHO LAST STRESS DIASTOLIC BP: 72
STRESS ECHO LAST STRESS HR: 156
STRESS ECHO LAST STRESS SYSTOLIC BP: 190
STRESS ECHO POST ESTIMATED WORKLOAD: 11.2
STRESS ECHO POST EXERCISE DUR MIN: 9
STRESS ECHO POST EXERCISE DUR SEC: 30
STRESS ECHO TARGET HR: 144

## 2025-08-29 PROCEDURE — 93325 DOPPLER ECHO COLOR FLOW MAPG: CPT | Mod: 26 | Performed by: INTERNAL MEDICINE

## 2025-08-29 PROCEDURE — 93321 DOPPLER ECHO F-UP/LMTD STD: CPT | Mod: 26 | Performed by: INTERNAL MEDICINE

## 2025-08-29 PROCEDURE — 93325 DOPPLER ECHO COLOR FLOW MAPG: CPT | Mod: TC

## 2025-08-29 PROCEDURE — 93350 STRESS TTE ONLY: CPT | Mod: 26 | Performed by: INTERNAL MEDICINE

## 2025-08-29 PROCEDURE — 93018 CV STRESS TEST I&R ONLY: CPT | Performed by: INTERNAL MEDICINE

## 2025-08-29 PROCEDURE — 93016 CV STRESS TEST SUPVJ ONLY: CPT | Performed by: INTERNAL MEDICINE

## (undated) DEVICE — KIT ENDO FIRST STEP DISINFECTANT 200ML W/POUCH EP-4

## (undated) DEVICE — PAD CHUX UNDERPAD 23X24" 7136

## (undated) RX ORDER — FENTANYL CITRATE 50 UG/ML
INJECTION, SOLUTION INTRAMUSCULAR; INTRAVENOUS
Status: DISPENSED
Start: 2024-02-13